# Patient Record
Sex: MALE | Race: WHITE | NOT HISPANIC OR LATINO | URBAN - METROPOLITAN AREA
[De-identification: names, ages, dates, MRNs, and addresses within clinical notes are randomized per-mention and may not be internally consistent; named-entity substitution may affect disease eponyms.]

---

## 2017-02-24 ENCOUNTER — EMERGENCY (EMERGENCY)
Facility: HOSPITAL | Age: 57
LOS: 0 days | Discharge: HOME | End: 2017-02-24
Attending: EMERGENCY MEDICINE
Payer: COMMERCIAL

## 2017-02-24 VITALS
RESPIRATION RATE: 17 BRPM | DIASTOLIC BLOOD PRESSURE: 104 MMHG | SYSTOLIC BLOOD PRESSURE: 151 MMHG | TEMPERATURE: 98 F | WEIGHT: 264.55 LBS | HEIGHT: 70 IN | OXYGEN SATURATION: 95 % | HEART RATE: 94 BPM

## 2017-02-24 VITALS
RESPIRATION RATE: 18 BRPM | SYSTOLIC BLOOD PRESSURE: 143 MMHG | HEART RATE: 86 BPM | TEMPERATURE: 100 F | OXYGEN SATURATION: 96 % | DIASTOLIC BLOOD PRESSURE: 94 MMHG

## 2017-02-24 DIAGNOSIS — S60.412A ABRASION OF RIGHT MIDDLE FINGER, INITIAL ENCOUNTER: Chronic | ICD-10-CM

## 2017-02-24 DIAGNOSIS — N40.0 BENIGN PROSTATIC HYPERPLASIA WITHOUT LOWER URINARY TRACT SYMPTOMS: ICD-10-CM

## 2017-02-24 DIAGNOSIS — I10 ESSENTIAL (PRIMARY) HYPERTENSION: ICD-10-CM

## 2017-02-24 DIAGNOSIS — K60.2 ANAL FISSURE, UNSPECIFIED: ICD-10-CM

## 2017-02-24 DIAGNOSIS — K59.00 CONSTIPATION, UNSPECIFIED: ICD-10-CM

## 2017-02-24 DIAGNOSIS — Z88.0 ALLERGY STATUS TO PENICILLIN: ICD-10-CM

## 2017-02-24 DIAGNOSIS — S83.242A OTHER TEAR OF MEDIAL MENISCUS, CURRENT INJURY, LEFT KNEE, INITIAL ENCOUNTER: Chronic | ICD-10-CM

## 2017-02-24 DIAGNOSIS — K92.1 MELENA: ICD-10-CM

## 2017-02-24 DIAGNOSIS — Z86.73 PERSONAL HISTORY OF TRANSIENT ISCHEMIC ATTACK (TIA), AND CEREBRAL INFARCTION WITHOUT RESIDUAL DEFICITS: ICD-10-CM

## 2017-02-24 LAB
ALBUMIN SERPL ELPH-MCNC: 3.5 G/DL — SIGNIFICANT CHANGE UP (ref 3.3–5)
ALP SERPL-CCNC: 110 U/L — SIGNIFICANT CHANGE UP (ref 40–120)
ALT FLD-CCNC: 49 U/L — SIGNIFICANT CHANGE UP (ref 12–78)
AMYLASE P1 CFR SERPL: 61 U/L — SIGNIFICANT CHANGE UP (ref 25–115)
ANION GAP SERPL CALC-SCNC: 10 MMOL/L — SIGNIFICANT CHANGE UP (ref 5–17)
APPEARANCE UR: CLEAR — SIGNIFICANT CHANGE UP
AST SERPL-CCNC: 31 U/L — SIGNIFICANT CHANGE UP (ref 15–37)
BACTERIA # UR AUTO: ABNORMAL
BASOPHILS # BLD AUTO: 0.1 K/UL — SIGNIFICANT CHANGE UP (ref 0–0.2)
BASOPHILS NFR BLD AUTO: 0.7 % — SIGNIFICANT CHANGE UP (ref 0–2)
BILIRUB SERPL-MCNC: 0.8 MG/DL — SIGNIFICANT CHANGE UP (ref 0.2–1.2)
BILIRUB UR-MCNC: NEGATIVE — SIGNIFICANT CHANGE UP
BUN SERPL-MCNC: 19 MG/DL — SIGNIFICANT CHANGE UP (ref 7–23)
CALCIUM SERPL-MCNC: 8.6 MG/DL — SIGNIFICANT CHANGE UP (ref 8.5–10.1)
CHLORIDE SERPL-SCNC: 101 MMOL/L — SIGNIFICANT CHANGE UP (ref 96–108)
CK SERPL-CCNC: 649 U/L — HIGH (ref 26–308)
CO2 SERPL-SCNC: 31 MMOL/L — SIGNIFICANT CHANGE UP (ref 22–31)
COLOR SPEC: YELLOW — SIGNIFICANT CHANGE UP
CREAT SERPL-MCNC: 1.08 MG/DL — SIGNIFICANT CHANGE UP (ref 0.5–1.3)
DIFF PNL FLD: ABNORMAL
EOSINOPHIL # BLD AUTO: 0.1 K/UL — SIGNIFICANT CHANGE UP (ref 0–0.5)
EOSINOPHIL NFR BLD AUTO: 1 % — SIGNIFICANT CHANGE UP (ref 0–6)
EPI CELLS # UR: SIGNIFICANT CHANGE UP
GLUCOSE SERPL-MCNC: 190 MG/DL — HIGH (ref 70–99)
GLUCOSE UR QL: NEGATIVE MG/DL — SIGNIFICANT CHANGE UP
HCT VFR BLD CALC: 41.8 % — SIGNIFICANT CHANGE UP (ref 39–50)
HGB BLD-MCNC: 14.7 G/DL — SIGNIFICANT CHANGE UP (ref 13–17)
HYALINE CASTS # UR AUTO: ABNORMAL /LPF
INR BLD: 1.07 RATIO — SIGNIFICANT CHANGE UP (ref 0.88–1.16)
KETONES UR-MCNC: ABNORMAL
LEUKOCYTE ESTERASE UR-ACNC: ABNORMAL
LYMPHOCYTES # BLD AUTO: 16 % — SIGNIFICANT CHANGE UP (ref 13–44)
LYMPHOCYTES # BLD AUTO: 2.1 K/UL — SIGNIFICANT CHANGE UP (ref 1–3.3)
MCHC RBC-ENTMCNC: 29.6 PG — SIGNIFICANT CHANGE UP (ref 27–34)
MCHC RBC-ENTMCNC: 35.3 GM/DL — SIGNIFICANT CHANGE UP (ref 32–36)
MCV RBC AUTO: 83.9 FL — SIGNIFICANT CHANGE UP (ref 80–100)
MONOCYTES # BLD AUTO: 0.7 K/UL — SIGNIFICANT CHANGE UP (ref 0–0.9)
MONOCYTES NFR BLD AUTO: 5.3 % — SIGNIFICANT CHANGE UP (ref 2–14)
NEUTROPHILS # BLD AUTO: 10.3 K/UL — HIGH (ref 1.8–7.4)
NEUTROPHILS NFR BLD AUTO: 77.1 % — HIGH (ref 43–77)
NITRITE UR-MCNC: NEGATIVE — SIGNIFICANT CHANGE UP
PH UR: 6 — SIGNIFICANT CHANGE UP (ref 4.8–8)
PLATELET # BLD AUTO: 259 K/UL — SIGNIFICANT CHANGE UP (ref 150–400)
POTASSIUM SERPL-MCNC: 3.3 MMOL/L — LOW (ref 3.5–5.3)
POTASSIUM SERPL-SCNC: 3.3 MMOL/L — LOW (ref 3.5–5.3)
PROT SERPL-MCNC: 7.4 GM/DL — SIGNIFICANT CHANGE UP (ref 6–8.3)
PROT UR-MCNC: 100 MG/DL
PROTHROM AB SERPL-ACNC: 12 SEC — SIGNIFICANT CHANGE UP (ref 10–13.1)
RBC # BLD: 4.98 M/UL — SIGNIFICANT CHANGE UP (ref 4.2–5.8)
RBC # FLD: 13 % — SIGNIFICANT CHANGE UP (ref 11–15)
RBC CASTS # UR COMP ASSIST: ABNORMAL /HPF (ref 0–4)
SODIUM SERPL-SCNC: 142 MMOL/L — SIGNIFICANT CHANGE UP (ref 135–145)
SP GR SPEC: 1.01 — SIGNIFICANT CHANGE UP (ref 1.01–1.02)
UROBILINOGEN FLD QL: NEGATIVE MG/DL — SIGNIFICANT CHANGE UP
WBC # BLD: 13.3 K/UL — HIGH (ref 3.8–10.5)
WBC # FLD AUTO: 13.3 K/UL — HIGH (ref 3.8–10.5)
WBC UR QL: SIGNIFICANT CHANGE UP

## 2017-02-24 PROCEDURE — 99284 EMERGENCY DEPT VISIT MOD MDM: CPT

## 2017-02-24 RX ORDER — DOCUSATE SODIUM 100 MG
1 CAPSULE ORAL
Qty: 7 | Refills: 0 | OUTPATIENT
Start: 2017-02-24 | End: 2017-03-03

## 2017-02-24 RX ORDER — POLYETHYLENE GLYCOL 3350 17 G/17G
17 POWDER, FOR SOLUTION ORAL
Qty: 150 | Refills: 0 | OUTPATIENT
Start: 2017-02-24 | End: 2017-03-03

## 2017-02-24 NOTE — ED ADULT NURSE NOTE - OBJECTIVE STATEMENT
patient received, alert and oriented x4, complaining of flank pain that started last night and rectal pain. stated last bowel movement was yesterday afternoon, solid, patient noted blood in stool. patient abdomen is patient's normal size, complaining of dizziness and nausea, no vomiting, no diarrhea, no constipation. patient received, alert and oriented x4, complaining of flank pain that started last night and rectal pain. stated last bowel movement was yesterday afternoon, solid, patient noted blood in stool. patient abdomen is patient's normal size, complaining of dizziness and nausea, no vomiting, no diarrhea, no constipation. as per patient's mom, patient has a chemical imbalance, patient has a history of psych disorder, unsure of what it is. that patient does not sleep well during the night.

## 2017-02-24 NOTE — ED PROVIDER NOTE - OBJECTIVE STATEMENT
Pertinent PMH/PSH/FHx/SHx and Review of Systems contained within:  Patient presents to the ED for rectal pain.  PMH of HTN, HLD, CVA, BPH.  Had BM today which was "hard like rocks."  VSS.  saw scant blood on paper.  no other complaints.  Non toxic.  Well appearing. No aggravating or relieving factors. No other pertinent PMH.  No other pertinent PSH.  No other pertinent FHx.  Patient denies EtOH/tobacco/illicit substance use. No fever/chills, No photophobia/eye pain/changes in vision, No ear pain/sore throat/dysphagia, No chest pain/palpitations, no SOB/cough/wheeze/stridor, No abdominal pain, No N/V/D, no dysuria/frequency/discharge, No neck/back pain, no rash, no changes in neurological status/function.

## 2017-02-24 NOTE — ED PROVIDER NOTE - MEDICAL DECISION MAKING DETAILS
Patient presnt with rectal pain now easing.  VSS.  Urinalysis demonstrates no acute pathology.  Urine culture pending. rectal with no stool.  minimal pain with small pile and likely mild fissure.  d/w patient.  will f/u.  Patient given prescription medications for their condition and advised to take them as prescribed and check with their Primary Care Provider if any questions arise. Discussed results and outcome of testing with the patient.  Patient advised to please follow up with their primary care doctor within the next 24 hours and return to the Emergency Department for worsening symptoms or any other concerns.  Patient advised that their doctor may call  to follow up on the specific results of the tests performed today in the emergency department.

## 2017-02-24 NOTE — ED PROVIDER NOTE - PHYSICAL EXAMINATION
Gen: Alert, NAD Head: NC, AT, PERRL, EOMI, normal lids/conjunctiva ENT: normal hearing, patent oropharynx without erythema/exudate, uvula midline Neck: +supple, no tenderness/meningismus/JVD, +Trachea midline Pulm: Bilateral BS, normal resp effort, no wheeze/stridor/retractions CV: RRR, no M/R/G, +dist pulses Abd: soft, NT/ND, +BS, no hepatosplenomegaly Mskel: no edema/erythema/cyanosis Skin: no rash Neuro: AAOx3, no sensory/motor deficits, CN 2-12 intact, rectal: normal tone, no stool

## 2017-02-24 NOTE — ED ADULT TRIAGE NOTE - CHIEF COMPLAINT QUOTE
Constipated  and feeling back pains, lightheadedness with dizziness and I think it may be my kidneys. I went to the bathroom and the stool was hard and difficulty to past. He took multiple medications and enema. He did pass stool, blood was in the toilet and when he wiped

## 2017-02-24 NOTE — ED ADULT NURSE NOTE - PSH
Abrasion of right middle finger    Acute medial meniscus tear of left knee    History of appendectomy

## 2017-08-11 ENCOUNTER — EMERGENCY (EMERGENCY)
Facility: HOSPITAL | Age: 57
LOS: 0 days | Discharge: ANOTHER HOSPITAL | End: 2017-08-12
Attending: EMERGENCY MEDICINE
Payer: COMMERCIAL

## 2017-08-11 VITALS
HEIGHT: 67 IN | RESPIRATION RATE: 17 BRPM | HEART RATE: 61 BPM | OXYGEN SATURATION: 100 % | SYSTOLIC BLOOD PRESSURE: 131 MMHG | TEMPERATURE: 98 F | WEIGHT: 216.05 LBS | DIASTOLIC BLOOD PRESSURE: 102 MMHG

## 2017-08-11 DIAGNOSIS — Z88.0 ALLERGY STATUS TO PENICILLIN: ICD-10-CM

## 2017-08-11 DIAGNOSIS — E11.9 TYPE 2 DIABETES MELLITUS WITHOUT COMPLICATIONS: ICD-10-CM

## 2017-08-11 DIAGNOSIS — I62.00 NONTRAUMATIC SUBDURAL HEMORRHAGE, UNSPECIFIED: ICD-10-CM

## 2017-08-11 DIAGNOSIS — I10 ESSENTIAL (PRIMARY) HYPERTENSION: ICD-10-CM

## 2017-08-11 DIAGNOSIS — F17.210 NICOTINE DEPENDENCE, CIGARETTES, UNCOMPLICATED: ICD-10-CM

## 2017-08-11 DIAGNOSIS — Z88.9 ALLERGY STATUS TO UNSPECIFIED DRUGS, MEDICAMENTS AND BIOLOGICAL SUBSTANCES: ICD-10-CM

## 2017-08-11 DIAGNOSIS — R53.1 WEAKNESS: ICD-10-CM

## 2017-08-11 DIAGNOSIS — R51 HEADACHE: ICD-10-CM

## 2017-08-11 DIAGNOSIS — Z79.4 LONG TERM (CURRENT) USE OF INSULIN: ICD-10-CM

## 2017-08-11 DIAGNOSIS — S60.412A ABRASION OF RIGHT MIDDLE FINGER, INITIAL ENCOUNTER: Chronic | ICD-10-CM

## 2017-08-11 DIAGNOSIS — Z79.82 LONG TERM (CURRENT) USE OF ASPIRIN: ICD-10-CM

## 2017-08-11 DIAGNOSIS — E78.00 PURE HYPERCHOLESTEROLEMIA, UNSPECIFIED: ICD-10-CM

## 2017-08-11 DIAGNOSIS — S83.242A OTHER TEAR OF MEDIAL MENISCUS, CURRENT INJURY, LEFT KNEE, INITIAL ENCOUNTER: Chronic | ICD-10-CM

## 2017-08-11 LAB
ALBUMIN SERPL ELPH-MCNC: 3.7 G/DL — SIGNIFICANT CHANGE UP (ref 3.3–5)
ALP SERPL-CCNC: 119 U/L — SIGNIFICANT CHANGE UP (ref 40–120)
ALT FLD-CCNC: 63 U/L — SIGNIFICANT CHANGE UP (ref 12–78)
ANION GAP SERPL CALC-SCNC: 9 MMOL/L — SIGNIFICANT CHANGE UP (ref 5–17)
APTT BLD: 33.3 SEC — SIGNIFICANT CHANGE UP (ref 27.5–37.4)
AST SERPL-CCNC: 47 U/L — HIGH (ref 15–37)
BASOPHILS # BLD AUTO: 0.1 K/UL — SIGNIFICANT CHANGE UP (ref 0–0.2)
BASOPHILS NFR BLD AUTO: 0.4 % — SIGNIFICANT CHANGE UP (ref 0–2)
BILIRUB SERPL-MCNC: 1.2 MG/DL — SIGNIFICANT CHANGE UP (ref 0.2–1.2)
BUN SERPL-MCNC: 12 MG/DL — SIGNIFICANT CHANGE UP (ref 7–23)
CALCIUM SERPL-MCNC: 9.3 MG/DL — SIGNIFICANT CHANGE UP (ref 8.5–10.1)
CHLORIDE SERPL-SCNC: 100 MMOL/L — SIGNIFICANT CHANGE UP (ref 96–108)
CK MB BLD-MCNC: 1.9 % — SIGNIFICANT CHANGE UP (ref 0–3.5)
CK MB CFR SERPL CALC: 7.9 NG/ML — HIGH (ref 0.5–3.6)
CK SERPL-CCNC: 410 U/L — HIGH (ref 26–308)
CO2 SERPL-SCNC: 33 MMOL/L — HIGH (ref 22–31)
CREAT SERPL-MCNC: 1.22 MG/DL — SIGNIFICANT CHANGE UP (ref 0.5–1.3)
EOSINOPHIL # BLD AUTO: 0 K/UL — SIGNIFICANT CHANGE UP (ref 0–0.5)
EOSINOPHIL NFR BLD AUTO: 0.3 % — SIGNIFICANT CHANGE UP (ref 0–6)
GLUCOSE SERPL-MCNC: 160 MG/DL — HIGH (ref 70–99)
HCT VFR BLD CALC: 49.7 % — SIGNIFICANT CHANGE UP (ref 39–50)
HGB BLD-MCNC: 16.8 G/DL — SIGNIFICANT CHANGE UP (ref 13–17)
INR BLD: 1.09 RATIO — SIGNIFICANT CHANGE UP (ref 0.88–1.16)
LYMPHOCYTES # BLD AUTO: 1.5 K/UL — SIGNIFICANT CHANGE UP (ref 1–3.3)
LYMPHOCYTES # BLD AUTO: 11.3 % — LOW (ref 13–44)
MCHC RBC-ENTMCNC: 28.1 PG — SIGNIFICANT CHANGE UP (ref 27–34)
MCHC RBC-ENTMCNC: 33.8 GM/DL — SIGNIFICANT CHANGE UP (ref 32–36)
MCV RBC AUTO: 83.1 FL — SIGNIFICANT CHANGE UP (ref 80–100)
MONOCYTES # BLD AUTO: 0.6 K/UL — SIGNIFICANT CHANGE UP (ref 0–0.9)
MONOCYTES NFR BLD AUTO: 5 % — SIGNIFICANT CHANGE UP (ref 2–14)
NEUTROPHILS # BLD AUTO: 10.7 K/UL — HIGH (ref 1.8–7.4)
NEUTROPHILS NFR BLD AUTO: 83 % — HIGH (ref 43–77)
PLATELET # BLD AUTO: 301 K/UL — SIGNIFICANT CHANGE UP (ref 150–400)
POTASSIUM SERPL-MCNC: 3.4 MMOL/L — LOW (ref 3.5–5.3)
POTASSIUM SERPL-SCNC: 3.4 MMOL/L — LOW (ref 3.5–5.3)
PROT SERPL-MCNC: 8.1 GM/DL — SIGNIFICANT CHANGE UP (ref 6–8.3)
PROTHROM AB SERPL-ACNC: 11.9 SEC — SIGNIFICANT CHANGE UP (ref 9.8–12.7)
RBC # BLD: 5.98 M/UL — HIGH (ref 4.2–5.8)
RBC # FLD: 13.4 % — SIGNIFICANT CHANGE UP (ref 11–15)
SODIUM SERPL-SCNC: 142 MMOL/L — SIGNIFICANT CHANGE UP (ref 135–145)
TROPONIN I SERPL-MCNC: <.015 NG/ML — SIGNIFICANT CHANGE UP (ref 0.01–0.04)
WBC # BLD: 12.9 K/UL — HIGH (ref 3.8–10.5)
WBC # FLD AUTO: 12.9 K/UL — HIGH (ref 3.8–10.5)

## 2017-08-11 PROCEDURE — 99285 EMERGENCY DEPT VISIT HI MDM: CPT

## 2017-08-11 RX ORDER — ONDANSETRON 8 MG/1
8 TABLET, FILM COATED ORAL ONCE
Qty: 0 | Refills: 0 | Status: COMPLETED | OUTPATIENT
Start: 2017-08-11 | End: 2017-08-11

## 2017-08-11 RX ADMIN — ONDANSETRON 8 MILLIGRAM(S): 8 TABLET, FILM COATED ORAL at 23:09

## 2017-08-11 NOTE — ED PROVIDER NOTE - OBJECTIVE STATEMENT
Pertinent PMH/PSH/FHx/SHx and Review of Systems contained within:    58yo M w PMH of HTN, DM2, previous CVA noncompliant w ASA presents to ED c/o HA, abd pain & weakness.  Pt states about 3-4d ago he noted HA & has had intermittent difficulty walking.  Pt also c/o abd cramping w loose stools.  Denies fever, chills, vomiting, SOB, ingestion of contaminated foods, urinary sx, syncope, trauma.    No fever/chills, No photophobia/eye pain/changes in vision, No ear pain/sore throat/dysphagia, No chest pain/palpitations, no SOB/cough/wheeze/stridor, +abdominal pain, No neck/back pain, no rash, no changes in neurological status/function. Pertinent PMH/PSH/FHx/SHx and Review of Systems contained within:    56yo M w PMH of HTN, DM2, previous CVA L sided SDH not on ASA presents to ED c/o HA, abd pain & weakness.  Pt states about 3-4d ago he noted HA & has had intermittent difficulty walking.  Pt also c/o abd cramping w loose stools.  Denies fever, chills, vomiting, SOB, ingestion of contaminated foods, urinary sx, syncope, trauma.    No fever/chills, No photophobia/eye pain/changes in vision, No ear pain/sore throat/dysphagia, No chest pain/palpitations, no SOB/cough/wheeze/stridor, +abdominal pain, No neck/back pain, no rash, no changes in neurological status/function. Pertinent PMH/PSH/FHx/SHx and Review of Systems contained within:    56yo M w PMH of HTN, DM2, previous CVA not on ASA presents to ED c/o HA, abd pain & weakness.  Pt states about 3-4d ago he noted HA & has had intermittent difficulty walking.  Pt also c/o abd cramping w loose stools.  Denies fever, chills, vomiting, SOB, ingestion of contaminated foods, urinary sx, syncope, trauma.    No fever/chills, No photophobia/eye pain/changes in vision, No ear pain/sore throat/dysphagia, No chest pain/palpitations, no SOB/cough/wheeze/stridor, +abdominal pain, No neck/back pain, no rash, no changes in neurological status/function.

## 2017-08-11 NOTE — ED ADULT TRIAGE NOTE - CHIEF COMPLAINT QUOTE
dizziness , diffuses abdominal pain and back pain, nausea ,problem with urination, right side weakness duo to stroke general weakness, headache and abdominal pain

## 2017-08-11 NOTE — ED PROVIDER NOTE - PROGRESS NOTE DETAILS
Discussed CT findings w pt, states he had L sided SDH due to fall at [facility] months ago.  Denies fall this time. Discussed w Dr. Marquez, neurosurg accepted pt, endorsed to Dr. Brown in ED. Discussed CT findings w pt, states he had L sided SDH due to fall at [facility] months ago.  Pt states he was admitted, no procedures done.  Denies fall this time.

## 2017-08-11 NOTE — ED PROVIDER NOTE - PHYSICAL EXAMINATION
Gen: Alert, NAD, speaking in complete sentences  Head: NC, AT, PERRL, EOMI, normal lids/conjunctiva  ENT: normal hearing, patent oropharynx without erythema/exudate, uvula midline  Neck: supple, no tenderness/meningismus/JVD, Trachea midline  Pulm: Bilateral clear BS, normal resp effort, no wheeze/stridor/retractions  CV: RRR, no M/R/G, +dist pulses  Abd: soft, no focal TTP, ND, +BS, no guarding/rebound tenderness, +obese  Mskel: no edema/erythema/cyanosis, motor 5/5 and equal in upper & lower ext bilaterally  Skin: no rash  Neuro: AAOx3, no sensory/motor deficits, CN 2-12 intact, NIHSS 0

## 2017-08-11 NOTE — ED ADULT NURSE REASSESSMENT NOTE - NS ED NURSE REASSESS COMMENT FT1
Pt A&Ox4. No distress noted. Elevated /101 Noted. Pt c/o headache. Dr. Rivera notified. Awaiting orders. Pt due for meds, Will give and continue to monitor BP

## 2017-08-12 ENCOUNTER — TRANSCRIPTION ENCOUNTER (OUTPATIENT)
Age: 57
End: 2017-08-12

## 2017-08-12 ENCOUNTER — INPATIENT (INPATIENT)
Facility: HOSPITAL | Age: 57
LOS: 3 days | Discharge: NURSING, REHAB OR LONG-TERM CARE | DRG: 26 | End: 2017-08-16
Attending: NEUROLOGICAL SURGERY | Admitting: NEUROLOGICAL SURGERY
Payer: MEDICAID

## 2017-08-12 VITALS — SYSTOLIC BLOOD PRESSURE: 142 MMHG | DIASTOLIC BLOOD PRESSURE: 89 MMHG | HEART RATE: 90 BPM | RESPIRATION RATE: 17 BRPM

## 2017-08-12 VITALS
RESPIRATION RATE: 16 BRPM | HEART RATE: 92 BPM | DIASTOLIC BLOOD PRESSURE: 96 MMHG | OXYGEN SATURATION: 96 % | SYSTOLIC BLOOD PRESSURE: 165 MMHG

## 2017-08-12 DIAGNOSIS — I62.00 NONTRAUMATIC SUBDURAL HEMORRHAGE, UNSPECIFIED: ICD-10-CM

## 2017-08-12 DIAGNOSIS — S83.242A OTHER TEAR OF MEDIAL MENISCUS, CURRENT INJURY, LEFT KNEE, INITIAL ENCOUNTER: Chronic | ICD-10-CM

## 2017-08-12 DIAGNOSIS — E11.65 TYPE 2 DIABETES MELLITUS WITH HYPERGLYCEMIA: ICD-10-CM

## 2017-08-12 DIAGNOSIS — E78.5 HYPERLIPIDEMIA, UNSPECIFIED: ICD-10-CM

## 2017-08-12 DIAGNOSIS — S60.412A ABRASION OF RIGHT MIDDLE FINGER, INITIAL ENCOUNTER: Chronic | ICD-10-CM

## 2017-08-12 DIAGNOSIS — I10 ESSENTIAL (PRIMARY) HYPERTENSION: ICD-10-CM

## 2017-08-12 LAB
ALBUMIN SERPL ELPH-MCNC: 3.8 G/DL — SIGNIFICANT CHANGE UP (ref 3.3–5)
ALP SERPL-CCNC: 98 U/L — SIGNIFICANT CHANGE UP (ref 40–120)
ALT FLD-CCNC: 47 U/L RC — HIGH (ref 10–45)
ANION GAP SERPL CALC-SCNC: 14 MMOL/L — SIGNIFICANT CHANGE UP (ref 5–17)
ANION GAP SERPL CALC-SCNC: 14 MMOL/L — SIGNIFICANT CHANGE UP (ref 5–17)
AST SERPL-CCNC: 38 U/L — SIGNIFICANT CHANGE UP (ref 10–40)
BILIRUB SERPL-MCNC: 1.2 MG/DL — SIGNIFICANT CHANGE UP (ref 0.2–1.2)
BLD GP AB SCN SERPL QL: NEGATIVE — SIGNIFICANT CHANGE UP
BUN SERPL-MCNC: 15 MG/DL — SIGNIFICANT CHANGE UP (ref 7–23)
BUN SERPL-MCNC: 21 MG/DL — SIGNIFICANT CHANGE UP (ref 7–23)
CALCIUM SERPL-MCNC: 8.5 MG/DL — SIGNIFICANT CHANGE UP (ref 8.4–10.5)
CALCIUM SERPL-MCNC: 8.9 MG/DL — SIGNIFICANT CHANGE UP (ref 8.4–10.5)
CHLORIDE SERPL-SCNC: 100 MMOL/L — SIGNIFICANT CHANGE UP (ref 96–108)
CHLORIDE SERPL-SCNC: 101 MMOL/L — SIGNIFICANT CHANGE UP (ref 96–108)
CHOLEST SERPL-MCNC: 177 MG/DL — SIGNIFICANT CHANGE UP (ref 10–199)
CO2 SERPL-SCNC: 25 MMOL/L — SIGNIFICANT CHANGE UP (ref 22–31)
CO2 SERPL-SCNC: 27 MMOL/L — SIGNIFICANT CHANGE UP (ref 22–31)
CREAT SERPL-MCNC: 1.01 MG/DL — SIGNIFICANT CHANGE UP (ref 0.5–1.3)
CREAT SERPL-MCNC: 1.25 MG/DL — SIGNIFICANT CHANGE UP (ref 0.5–1.3)
GLUCOSE SERPL-MCNC: 228 MG/DL — HIGH (ref 70–99)
GLUCOSE SERPL-MCNC: 254 MG/DL — HIGH (ref 70–99)
HBA1C BLD-MCNC: 7.5 % — HIGH (ref 4–5.6)
HCT VFR BLD CALC: 44.8 % — SIGNIFICANT CHANGE UP (ref 39–50)
HCT VFR BLD CALC: 47.7 % — SIGNIFICANT CHANGE UP (ref 39–50)
HDLC SERPL-MCNC: 43 MG/DL — SIGNIFICANT CHANGE UP (ref 40–125)
HGB BLD-MCNC: 15 G/DL — SIGNIFICANT CHANGE UP (ref 13–17)
HGB BLD-MCNC: 16.4 G/DL — SIGNIFICANT CHANGE UP (ref 13–17)
LIPID PNL WITH DIRECT LDL SERPL: 116 MG/DL — SIGNIFICANT CHANGE UP
MAGNESIUM SERPL-MCNC: 1.4 MG/DL — LOW (ref 1.6–2.6)
MCHC RBC-ENTMCNC: 29.1 PG — SIGNIFICANT CHANGE UP (ref 27–34)
MCHC RBC-ENTMCNC: 29.7 PG — SIGNIFICANT CHANGE UP (ref 27–34)
MCHC RBC-ENTMCNC: 33.5 GM/DL — SIGNIFICANT CHANGE UP (ref 32–36)
MCHC RBC-ENTMCNC: 34.3 GM/DL — SIGNIFICANT CHANGE UP (ref 32–36)
MCV RBC AUTO: 86.7 FL — SIGNIFICANT CHANGE UP (ref 80–100)
MCV RBC AUTO: 86.9 FL — SIGNIFICANT CHANGE UP (ref 80–100)
PHOSPHATE SERPL-MCNC: 2.9 MG/DL — SIGNIFICANT CHANGE UP (ref 2.5–4.5)
PLATELET # BLD AUTO: 293 K/UL — SIGNIFICANT CHANGE UP (ref 150–400)
PLATELET # BLD AUTO: 333 K/UL — SIGNIFICANT CHANGE UP (ref 150–400)
POTASSIUM SERPL-MCNC: 3.7 MMOL/L — SIGNIFICANT CHANGE UP (ref 3.5–5.3)
POTASSIUM SERPL-MCNC: 3.8 MMOL/L — SIGNIFICANT CHANGE UP (ref 3.5–5.3)
POTASSIUM SERPL-SCNC: 3.7 MMOL/L — SIGNIFICANT CHANGE UP (ref 3.5–5.3)
POTASSIUM SERPL-SCNC: 3.8 MMOL/L — SIGNIFICANT CHANGE UP (ref 3.5–5.3)
PROT SERPL-MCNC: 7.3 G/DL — SIGNIFICANT CHANGE UP (ref 6–8.3)
RBC # BLD: 5.16 M/UL — SIGNIFICANT CHANGE UP (ref 4.2–5.8)
RBC # BLD: 5.51 M/UL — SIGNIFICANT CHANGE UP (ref 4.2–5.8)
RBC # FLD: 13.3 % — SIGNIFICANT CHANGE UP (ref 10.3–14.5)
RBC # FLD: 13.4 % — SIGNIFICANT CHANGE UP (ref 10.3–14.5)
RH IG SCN BLD-IMP: POSITIVE — SIGNIFICANT CHANGE UP
RH IG SCN BLD-IMP: POSITIVE — SIGNIFICANT CHANGE UP
SODIUM SERPL-SCNC: 140 MMOL/L — SIGNIFICANT CHANGE UP (ref 135–145)
SODIUM SERPL-SCNC: 141 MMOL/L — SIGNIFICANT CHANGE UP (ref 135–145)
TOTAL CHOLESTEROL/HDL RATIO MEASUREMENT: 4.1 RATIO — SIGNIFICANT CHANGE UP (ref 3.4–9.6)
TRIGL SERPL-MCNC: 88 MG/DL — SIGNIFICANT CHANGE UP (ref 10–149)
WBC # BLD: 11.7 K/UL — HIGH (ref 3.8–10.5)
WBC # BLD: 20.7 K/UL — HIGH (ref 3.8–10.5)
WBC # FLD AUTO: 11.7 K/UL — HIGH (ref 3.8–10.5)
WBC # FLD AUTO: 20.7 K/UL — HIGH (ref 3.8–10.5)

## 2017-08-12 PROCEDURE — 70450 CT HEAD/BRAIN W/O DYE: CPT | Mod: 26,77

## 2017-08-12 PROCEDURE — 93306 TTE W/DOPPLER COMPLETE: CPT | Mod: 26

## 2017-08-12 PROCEDURE — 99223 1ST HOSP IP/OBS HIGH 75: CPT

## 2017-08-12 PROCEDURE — 99291 CRITICAL CARE FIRST HOUR: CPT

## 2017-08-12 PROCEDURE — 70450 CT HEAD/BRAIN W/O DYE: CPT | Mod: 26,59

## 2017-08-12 PROCEDURE — 71010: CPT | Mod: 26

## 2017-08-12 PROCEDURE — 61154 BURR HOLE W/EVAC&/DRG HMTMA: CPT | Mod: RT

## 2017-08-12 PROCEDURE — 74177 CT ABD & PELVIS W/CONTRAST: CPT | Mod: 26

## 2017-08-12 PROCEDURE — 70496 CT ANGIOGRAPHY HEAD: CPT | Mod: 26

## 2017-08-12 PROCEDURE — 93010 ELECTROCARDIOGRAM REPORT: CPT

## 2017-08-12 RX ORDER — INSULIN GLARGINE 100 [IU]/ML
5 INJECTION, SOLUTION SUBCUTANEOUS AT BEDTIME
Qty: 0 | Refills: 0 | Status: DISCONTINUED | OUTPATIENT
Start: 2017-08-12 | End: 2017-08-12

## 2017-08-12 RX ORDER — AMLODIPINE BESYLATE 2.5 MG/1
5 TABLET ORAL DAILY
Qty: 0 | Refills: 0 | Status: DISCONTINUED | OUTPATIENT
Start: 2017-08-12 | End: 2017-08-12

## 2017-08-12 RX ORDER — SODIUM CHLORIDE 9 MG/ML
1000 INJECTION, SOLUTION INTRAVENOUS
Qty: 0 | Refills: 0 | Status: DISCONTINUED | OUTPATIENT
Start: 2017-08-12 | End: 2017-08-13

## 2017-08-12 RX ORDER — OXYCODONE AND ACETAMINOPHEN 5; 325 MG/1; MG/1
2 TABLET ORAL EVERY 4 HOURS
Qty: 0 | Refills: 0 | Status: DISCONTINUED | OUTPATIENT
Start: 2017-08-12 | End: 2017-08-16

## 2017-08-12 RX ORDER — LOSARTAN POTASSIUM 100 MG/1
25 TABLET, FILM COATED ORAL DAILY
Qty: 0 | Refills: 0 | Status: DISCONTINUED | OUTPATIENT
Start: 2017-08-12 | End: 2017-08-12

## 2017-08-12 RX ORDER — DEXTROSE 50 % IN WATER 50 %
12.5 SYRINGE (ML) INTRAVENOUS ONCE
Qty: 0 | Refills: 0 | Status: DISCONTINUED | OUTPATIENT
Start: 2017-08-12 | End: 2017-08-16

## 2017-08-12 RX ORDER — DEXTROSE 50 % IN WATER 50 %
25 SYRINGE (ML) INTRAVENOUS ONCE
Qty: 0 | Refills: 0 | Status: DISCONTINUED | OUTPATIENT
Start: 2017-08-12 | End: 2017-08-16

## 2017-08-12 RX ORDER — AMLODIPINE BESYLATE 2.5 MG/1
5 TABLET ORAL DAILY
Qty: 0 | Refills: 0 | Status: DISCONTINUED | OUTPATIENT
Start: 2017-08-12 | End: 2017-08-13

## 2017-08-12 RX ORDER — ACETAMINOPHEN 500 MG
650 TABLET ORAL EVERY 6 HOURS
Qty: 0 | Refills: 0 | Status: DISCONTINUED | OUTPATIENT
Start: 2017-08-12 | End: 2017-08-16

## 2017-08-12 RX ORDER — FAMOTIDINE 10 MG/ML
20 INJECTION INTRAVENOUS EVERY 12 HOURS
Qty: 0 | Refills: 0 | Status: DISCONTINUED | OUTPATIENT
Start: 2017-08-12 | End: 2017-08-16

## 2017-08-12 RX ORDER — DIPHENHYDRAMINE HCL 50 MG
50 CAPSULE ORAL ONCE
Qty: 0 | Refills: 0 | Status: COMPLETED | OUTPATIENT
Start: 2017-08-12 | End: 2017-08-12

## 2017-08-12 RX ORDER — POTASSIUM CHLORIDE 20 MEQ
20 PACKET (EA) ORAL ONCE
Qty: 0 | Refills: 0 | Status: COMPLETED | OUTPATIENT
Start: 2017-08-12 | End: 2017-08-12

## 2017-08-12 RX ORDER — LEVETIRACETAM 250 MG/1
500 TABLET, FILM COATED ORAL EVERY 12 HOURS
Qty: 0 | Refills: 0 | Status: DISCONTINUED | OUTPATIENT
Start: 2017-08-12 | End: 2017-08-16

## 2017-08-12 RX ORDER — OXYCODONE AND ACETAMINOPHEN 5; 325 MG/1; MG/1
1 TABLET ORAL EVERY 4 HOURS
Qty: 0 | Refills: 0 | Status: DISCONTINUED | OUTPATIENT
Start: 2017-08-12 | End: 2017-08-16

## 2017-08-12 RX ORDER — NICARDIPINE HYDROCHLORIDE 30 MG/1
2.5 CAPSULE, EXTENDED RELEASE ORAL
Qty: 40 | Refills: 0 | Status: DISCONTINUED | OUTPATIENT
Start: 2017-08-12 | End: 2017-08-12

## 2017-08-12 RX ORDER — DEXTROSE MONOHYDRATE, SODIUM CHLORIDE, AND POTASSIUM CHLORIDE 50; .745; 4.5 G/1000ML; G/1000ML; G/1000ML
1000 INJECTION, SOLUTION INTRAVENOUS
Qty: 0 | Refills: 0 | Status: DISCONTINUED | OUTPATIENT
Start: 2017-08-12 | End: 2017-08-13

## 2017-08-12 RX ORDER — INSULIN LISPRO 100/ML
VIAL (ML) SUBCUTANEOUS
Qty: 0 | Refills: 0 | Status: DISCONTINUED | OUTPATIENT
Start: 2017-08-12 | End: 2017-08-16

## 2017-08-12 RX ORDER — DEXAMETHASONE 0.5 MG/5ML
10 ELIXIR ORAL ONCE
Qty: 0 | Refills: 0 | Status: COMPLETED | OUTPATIENT
Start: 2017-08-12 | End: 2017-08-12

## 2017-08-12 RX ORDER — ACETAMINOPHEN 500 MG
975 TABLET ORAL ONCE
Qty: 0 | Refills: 0 | Status: COMPLETED | OUTPATIENT
Start: 2017-08-12 | End: 2017-08-12

## 2017-08-12 RX ORDER — DOCUSATE SODIUM 100 MG
100 CAPSULE ORAL DAILY
Qty: 0 | Refills: 0 | Status: DISCONTINUED | OUTPATIENT
Start: 2017-08-12 | End: 2017-08-12

## 2017-08-12 RX ORDER — METOCLOPRAMIDE HCL 10 MG
10 TABLET ORAL ONCE
Qty: 0 | Refills: 0 | Status: COMPLETED | OUTPATIENT
Start: 2017-08-12 | End: 2017-08-12

## 2017-08-12 RX ORDER — DEXAMETHASONE 0.5 MG/5ML
10 ELIXIR ORAL ONCE
Qty: 0 | Refills: 0 | Status: DISCONTINUED | OUTPATIENT
Start: 2017-08-12 | End: 2017-08-12

## 2017-08-12 RX ORDER — INSULIN LISPRO 100/ML
VIAL (ML) SUBCUTANEOUS AT BEDTIME
Qty: 0 | Refills: 0 | Status: DISCONTINUED | OUTPATIENT
Start: 2017-08-12 | End: 2017-08-16

## 2017-08-12 RX ORDER — ONDANSETRON 8 MG/1
4 TABLET, FILM COATED ORAL EVERY 6 HOURS
Qty: 0 | Refills: 0 | Status: DISCONTINUED | OUTPATIENT
Start: 2017-08-12 | End: 2017-08-16

## 2017-08-12 RX ORDER — DEXTROSE 50 % IN WATER 50 %
1 SYRINGE (ML) INTRAVENOUS ONCE
Qty: 0 | Refills: 0 | Status: DISCONTINUED | OUTPATIENT
Start: 2017-08-12 | End: 2017-08-16

## 2017-08-12 RX ORDER — INSULIN LISPRO 100/ML
VIAL (ML) SUBCUTANEOUS
Qty: 0 | Refills: 0 | Status: DISCONTINUED | OUTPATIENT
Start: 2017-08-12 | End: 2017-08-12

## 2017-08-12 RX ORDER — LEVETIRACETAM 250 MG/1
1000 TABLET, FILM COATED ORAL ONCE
Qty: 0 | Refills: 0 | Status: COMPLETED | OUTPATIENT
Start: 2017-08-12 | End: 2017-08-12

## 2017-08-12 RX ORDER — ATORVASTATIN CALCIUM 80 MG/1
10 TABLET, FILM COATED ORAL AT BEDTIME
Qty: 0 | Refills: 0 | Status: DISCONTINUED | OUTPATIENT
Start: 2017-08-12 | End: 2017-08-12

## 2017-08-12 RX ORDER — INSULIN LISPRO 100/ML
7 VIAL (ML) SUBCUTANEOUS
Qty: 0 | Refills: 0 | Status: DISCONTINUED | OUTPATIENT
Start: 2017-08-12 | End: 2017-08-13

## 2017-08-12 RX ORDER — INSULIN GLARGINE 100 [IU]/ML
20 INJECTION, SOLUTION SUBCUTANEOUS AT BEDTIME
Qty: 0 | Refills: 0 | Status: DISCONTINUED | OUTPATIENT
Start: 2017-08-12 | End: 2017-08-13

## 2017-08-12 RX ORDER — LOSARTAN POTASSIUM 100 MG/1
25 TABLET, FILM COATED ORAL DAILY
Qty: 0 | Refills: 0 | Status: DISCONTINUED | OUTPATIENT
Start: 2017-08-12 | End: 2017-08-15

## 2017-08-12 RX ORDER — MORPHINE SULFATE 50 MG/1
2 CAPSULE, EXTENDED RELEASE ORAL EVERY 4 HOURS
Qty: 0 | Refills: 0 | Status: DISCONTINUED | OUTPATIENT
Start: 2017-08-12 | End: 2017-08-12

## 2017-08-12 RX ORDER — GLUCAGON INJECTION, SOLUTION 0.5 MG/.1ML
1 INJECTION, SOLUTION SUBCUTANEOUS ONCE
Qty: 0 | Refills: 0 | Status: DISCONTINUED | OUTPATIENT
Start: 2017-08-12 | End: 2017-08-16

## 2017-08-12 RX ORDER — OXYCODONE AND ACETAMINOPHEN 5; 325 MG/1; MG/1
2 TABLET ORAL EVERY 6 HOURS
Qty: 0 | Refills: 0 | Status: DISCONTINUED | OUTPATIENT
Start: 2017-08-12 | End: 2017-08-12

## 2017-08-12 RX ORDER — ENOXAPARIN SODIUM 100 MG/ML
40 INJECTION SUBCUTANEOUS AT BEDTIME
Qty: 0 | Refills: 0 | Status: DISCONTINUED | OUTPATIENT
Start: 2017-08-13 | End: 2017-08-16

## 2017-08-12 RX ADMIN — Medication 100 MILLIGRAM(S): at 22:57

## 2017-08-12 RX ADMIN — LOSARTAN POTASSIUM 25 MILLIGRAM(S): 100 TABLET, FILM COATED ORAL at 22:57

## 2017-08-12 RX ADMIN — Medication 100 MILLIGRAM(S): at 13:05

## 2017-08-12 RX ADMIN — OXYCODONE AND ACETAMINOPHEN 1 TABLET(S): 5; 325 TABLET ORAL at 23:07

## 2017-08-12 RX ADMIN — FAMOTIDINE 20 MILLIGRAM(S): 10 INJECTION INTRAVENOUS at 17:42

## 2017-08-12 RX ADMIN — NICARDIPINE HYDROCHLORIDE 12.5 MG/HR: 30 CAPSULE, EXTENDED RELEASE ORAL at 03:32

## 2017-08-12 RX ADMIN — Medication 20 MILLIEQUIVALENT(S): at 13:04

## 2017-08-12 RX ADMIN — Medication 8: at 18:35

## 2017-08-12 RX ADMIN — OXYCODONE AND ACETAMINOPHEN 1 TABLET(S): 5; 325 TABLET ORAL at 23:37

## 2017-08-12 RX ADMIN — Medication 2: at 11:22

## 2017-08-12 RX ADMIN — Medication 50 MILLIGRAM(S): at 00:30

## 2017-08-12 RX ADMIN — AMLODIPINE BESYLATE 5 MILLIGRAM(S): 2.5 TABLET ORAL at 11:21

## 2017-08-12 RX ADMIN — Medication 2: at 22:57

## 2017-08-12 RX ADMIN — Medication 10 MILLIGRAM(S): at 00:30

## 2017-08-12 RX ADMIN — INSULIN GLARGINE 20 UNIT(S): 100 INJECTION, SOLUTION SUBCUTANEOUS at 22:58

## 2017-08-12 RX ADMIN — LEVETIRACETAM 1000 MILLIGRAM(S): 250 TABLET, FILM COATED ORAL at 05:37

## 2017-08-12 RX ADMIN — LEVETIRACETAM 500 MILLIGRAM(S): 250 TABLET, FILM COATED ORAL at 17:42

## 2017-08-12 RX ADMIN — Medication 7 UNIT(S): at 18:35

## 2017-08-12 RX ADMIN — Medication 102 MILLIGRAM(S): at 03:47

## 2017-08-12 RX ADMIN — Medication 975 MILLIGRAM(S): at 00:30

## 2017-08-12 NOTE — PROGRESS NOTE ADULT - ASSESSMENT
ASSESSMENT/PLAN:  atraumatic SDH s/p mary grace hole evacuation    NEURO: Neurochecks q1h, keppra sz ppx to end 8/19, analgesia prn,   Activity: [] mobilize as tolerated [] Bedrest [] PT [] OT [] PMNR    PULM: NRB for pneumocephalus    CV: home antihypertensive regimen, TTE  SBP goal 100-150    RENAL: continue IVF until po intake suficient then IVL  Fluids: NS @50ml/hr    GI: famotidine  Diet: CCD diet  GI prophylaxis [] not indicated [] PPI [] other:  Bowel regimen [] colace [] senna [] other:    ENDO: insulin sliding scale  Goal euglycemia (-180)    HEME/ONC:   VTE prophylaxis: [] SCDs [X] hold chemoprophylaxis due to fresh post op [] high risk of DVT/PE on admission due to:    ID: periop abx    MISC: jose roberto mullins    SOCIAL/FAMILY:  [] awaiting [] updated at bedside [] family meeting    CODE STATUS:  [X] Full Code [] DNR [] DNI [] Palliative/Comfort Care    DISPOSITION:  [X] ICU [] Stroke Unit [] Floor [] EMU [] RCU [] PCU    [X] Patient is at high risk of neurologic deterioration/death due to: infection, post operative hemorrhage, brain compression, SDH reaccumulation, seizure    Time seen: 1100  Time spent: 30 [X] critical care minutes ASSESSMENT/PLAN:  atraumatic SDH s/p mary grace hole evacuation    NEURO: Neurochecks q1h, keppra sz ppx to end 8/19, analgesia prn,   Activity: [] mobilize as tolerated [] Bedrest [] PT [] OT [] PMNR    PULM: NRB for pneumocephalus    CV: home antihypertensive regimen, TTE  SBP goal 100-150    RENAL: continue IVF until po intake suficient then IVL  Fluids: NS @50ml/hr    GI: famotidine  Diet: CCD diet  GI prophylaxis [] not indicated [] PPI [] other:  Bowel regimen [] colace [] senna [] other:    ENDO: insulin sliding scale  Goal euglycemia (-180)    HEME/ONC:   VTE prophylaxis: [] SCDs [X] hold chemoprophylaxis due to fresh post op [] high risk of DVT/PE on admission due to:    ID: periop abx    MISC: jose roberto mullins    SOCIAL/FAMILY:  [] awaiting [] updated at bedside [] family meeting    CODE STATUS:  [X] Full Code [] DNR [] DNI [] Palliative/Comfort Care    DISPOSITION:  [X] ICU [] Stroke Unit [] Floor [] EMU [] RCU [] PCU    [X] Patient is at high risk of neurologic deterioration/death due to: infection, post operative hemorrhage, brain compression, SDH reaccumulation, seizure    Time seen: 1100am  Time spent: 30 critical care minutes

## 2017-08-12 NOTE — H&P ADULT - HISTORY OF PRESENT ILLNESS
57M with HTN, HLD, DM presents with 3 days of difficulty walking. He went to [facility] where CT head showed large right sided subacute SDH with 1.5cm MLS. He denies history of head trauma. He denies antiplatelet/anticoagulation use. He has severe difficulty walking even with assistance. He was complaining of headache at [facility] which was treated with benadryl, decadron, and reglan however he does not currently complain of headache. He is awake and appropriately responsive although at times seems to fall asleep which he attributes to the benadryl. Notably, he states that he had left sided hematoma that was treated non-operatively at [facility]. It is now not apparent on the CT head done tday.

## 2017-08-12 NOTE — ED PROVIDER NOTE - ATTENDING CONTRIBUTION TO CARE
57y Male on ASA BIBEMS for R Subdural hematoma ~2.5cm, 1.5cm shift no trauma sent as transfer with l sided weakness, awake and alert, seen by NS for admission for OR to sicu, preop, repeat cth, load on keppra.

## 2017-08-12 NOTE — ED ADULT NURSE REASSESSMENT NOTE - NS ED NURSE REASSESS COMMENT FT1
At around 0500, per Neurosurgery Resident, REECE Reed, he wants systolic at about 200 so cardene drip stopped.

## 2017-08-12 NOTE — H&P ADULT - NSHPPHYSICALEXAM_GEN_ALL_CORE
drowsy but easily arousable, oriented x3, PERRL, EOMI, no facial, palate elevation symmetric, tongue midline, shrug 5/5  right side 5/5  left side 4+/5 with left drift  SILT  no clonus or Babinski

## 2017-08-12 NOTE — ED PROVIDER NOTE - PHYSICAL EXAMINATION
PE: CONSTITUTIONAL: Nontoxic, in no apparent distress. ENMT: Airway patent, nasal mucosa clear, mouth with normal mucosa. HEAD: NCAT EYES: PERRL, EOMI bilaterally CARDIAC: RRR, no m/r/g, no pedal edema RESPIRATORY: CTA bilaterally, no adventitious sounds GI: Abdomen non-distended, non-tender MSK: Spine appears normal, range of motion is not limited, no muscle/joint tenderness NEURO: CNII-XII grossly intact, 5/5 strength, full sensation all extremities, gait not tested SKIN: Skin tone normal in color, warm and dry. No evidence of rash.

## 2017-08-12 NOTE — ED ADULT NURSE REASSESSMENT NOTE - NS ED NURSE REASSESS COMMENT FT1
At around 0630, reported to KARMEN Casper @ OR. Per Pennie, pt. [facility] ashley Soto rn, aware.

## 2017-08-12 NOTE — CONSULT NOTE ADULT - ASSESSMENT
64 yo male with poorly controlled type 2 diabetes and overweight after procedure for subdural hematoma this morning 56 yo male with poorly controlled type 2 diabetes and overweight after procedure for subdural hematoma this morning

## 2017-08-12 NOTE — PROGRESS NOTE ADULT - SUBJECTIVE AND OBJECTIVE BOX
57M with HTN, HLD, DM presents with 3 days of difficulty walking. He went to [facility] where CT head showed large right sided subacute SDH with 1.5cm MLS. He denies history of head trauma. He denies antiplatelet/anticoagulation use. He has severe difficulty walking even with assistance. He was complaining of headache at [facility] which was treated with benadryl, decadron, and reglan however he does not currently complain of headache. He is awake and appropriately responsive although at times seems to fall asleep which he attributes to the benadryl. Notably, he states that he had left sided hematoma that was treated non-operatively at [facility]. Transferred underwent R mary grace hole placement, post op CT demonstrated significant hydrocephalus.     ROS: negative [] unable to obtain as patient is comatose/intubated/aphasic []   VITALS:   T(C): 36.6 (08-12-17 @ 09:05), Max: 36.7 (08-11-17 @ 20:32)  HR: 74 (08-12-17 @ 10:00) (61 - 92)  BP: 175/125 (08-12-17 @ 05:45) (131/102 - 175/125)  RR: 20 (08-12-17 @ 10:00) (16 - 37)  SpO2: 100% (08-12-17 @ 10:00) (93% - 100%)    08-12-17 @ 07:01  -  08-12-17 @ 10:39  --------------------------------------------------------  IN: 255 mL / OUT: 235 mL / NET: 20 mL      LABS:  ABG - ( 12 Aug 2017 08:23 )  pH: 7.53  /  pCO2: 31    /  pO2: 190   / HCO3: 26    / Base Excess: 4.4   /  SaO2: 100                                     16.4   11.7  )-----------( 293      ( 12 Aug 2017 09:43 )             47.7     08-12    140  |  101  |  15  ----------------------------<  228<H>  3.7   |  25  |  1.01    Ca    8.9      12 Aug 2017 09:43    TPro  7.3  /  Alb  3.8  /  TBili  1.2  /  DBili  x   /  AST  38  /  ALT  47<H>  /  AlkPhos  98  08-12    PT/INR - ( 11 Aug 2017 23:24 )   PT: 11.9 sec;   INR: 1.09 ratio         PTT - ( 11 Aug 2017 23:24 )  PTT:33.3 sec  MEDS:  MEDICATIONS  (STANDING):  sodium chloride 0.9% with potassium chloride 20 mEq/L 1000 milliLiter(s) (85 mL/Hr) IV Continuous <Continuous>  levETIRAcetam 500 milliGRAM(s) Oral every 12 hours  famotidine    Tablet 20 milliGRAM(s) Oral every 12 hours  clindamycin IVPB 900 milliGRAM(s) IV Intermittent every 8 hours      [All pertinent recent Imaging/Reports reviewed]    DEVICES: [] Restraints [] ROSIE/HMV []LD [] ET tube [] Trach [] A-line [] Mendez [] NGT [] Rectal Tube     General: well apprearing, no acute distress  Neurological: alert, oriented x3, language intact, perrl, eomi, face=, tup midline, 5/5 throughout, sensation intact to light touch, no dysmetria.   CVS: rrr  Pulm: ctabl  Abd: soft/nt/nd  Ext: warm, well perfused  Skin: no rashes 57M with HTN, HLD, DM presents with 3 days of difficulty walking. He went to [facility] where CT head showed large right sided subacute SDH with 1.5cm MLS. He denies history of head trauma. He denies antiplatelet/anticoagulation use. He has severe difficulty walking even with assistance. He was complaining of headache at [facility] which was treated with benadryl, decadron, and reglan however he does not currently complain of headache. He is awake and appropriately responsive although at times seems to fall asleep which he attributes to the benadryl. Notably, he states that he had left sided hematoma that was treated non-operatively at [facility]. Transferred underwent R mary grace hole placement, post op CT demonstrated significant hydrocephalus.     ROS: negative    VITALS:   T(C): 36.6 (08-12-17 @ 09:05), Max: 36.7 (08-11-17 @ 20:32)  HR: 74 (08-12-17 @ 10:00) (61 - 92)  BP: 175/125 (08-12-17 @ 05:45) (131/102 - 175/125)  RR: 20 (08-12-17 @ 10:00) (16 - 37)  SpO2: 100% (08-12-17 @ 10:00) (93% - 100%)    08-12-17 @ 07:01  -  08-12-17 @ 10:39  --------------------------------------------------------  IN: 255 mL / OUT: 235 mL / NET: 20 mL      LABS:  ABG - ( 12 Aug 2017 08:23 )  pH: 7.53  /  pCO2: 31    /  pO2: 190   / HCO3: 26    / Base Excess: 4.4   /  SaO2: 100                                     16.4   11.7  )-----------( 293      ( 12 Aug 2017 09:43 )             47.7     08-12    140  |  101  |  15  ----------------------------<  228<H>  3.7   |  25  |  1.01    Ca    8.9      12 Aug 2017 09:43    TPro  7.3  /  Alb  3.8  /  TBili  1.2  /  DBili  x   /  AST  38  /  ALT  47<H>  /  AlkPhos  98  08-12    PT/INR - ( 11 Aug 2017 23:24 )   PT: 11.9 sec;   INR: 1.09 ratio         PTT - ( 11 Aug 2017 23:24 )  PTT:33.3 sec  MEDS:  MEDICATIONS  (STANDING):  sodium chloride 0.9% with potassium chloride 20 mEq/L 1000 milliLiter(s) (85 mL/Hr) IV Continuous <Continuous>  levETIRAcetam 500 milliGRAM(s) Oral every 12 hours  famotidine    Tablet 20 milliGRAM(s) Oral every 12 hours  clindamycin IVPB 900 milliGRAM(s) IV Intermittent every 8 hours      [All pertinent recent Imaging/Reports reviewed]    DEVICES: [] Restraints [] ROSIE/HMV []LD [] ET tube [] Trach [] A-line [] Mendez [] NGT [] Rectal Tube     General: well apprearing, no acute distress  Neurological: alert, oriented x3, language intact, perrl, eomi, face=, tup midline, 5/5 throughout, sensation intact to light touch, no dysmetria.   CVS: rrr  Pulm: ctabl  Abd: soft/nt/nd  Ext: warm, well perfused  Skin: no rashes

## 2017-08-12 NOTE — H&P ADULT - ASSESSMENT
57M with no history of trauma p/w 2.5cm subacute SDH with 1.5cm midline shift with difficulty walking for 3 days. He also has left sided weakness as a result of hematoma.     Admit to NSCU  Repeat CT head  Keppra 1g  Pre-op labs  NPO for OR for mary grace hole for evacuation for SDH  Discussed with patient result of CT scan and further management. Discussed risks, benefits, alternatives of surgery with patient. All questions answered.

## 2017-08-12 NOTE — ED ADULT NURSE NOTE - OBJECTIVE STATEMENT
Pt. is a 56 yo male who is a transfer from [facility] with a dx of SDH. Per EMS, pt. presented to [place] ED yesterday c/o headache. Upon performing a CT, pt. found to have a 2.5 mm R- sided SDH. He does have a hx of L-sided SDH from last year.

## 2017-08-12 NOTE — ED PROVIDER NOTE - OBJECTIVE STATEMENT
57y Male on ASA BIBEMS for R Subdural hematoma ~2.5cm, 1.5cm shift. 57y Male PMH HTN, DM2, previous CVA not on ASA (noncompliant) BIBEMS for R Subdural hematoma ~2.5cm, 1.5cm shift. Noted to have SDH at [facility] 2/2 fall several months ago, monitored without intervention. Main complaints of headache, weakness, abdominal pain and difficulty walking. CTAP unremarkable, but CT head with above SDH.

## 2017-08-12 NOTE — CONSULT NOTE ADULT - SUBJECTIVE AND OBJECTIVE BOX
HPI:  57M with HTN, HLD, DM presents with 3 days of difficulty walking. He went to [facility] where CT head showed large right sided subacute SDH with 1.5cm MLS. He denies history of head trauma. He denies antiplatelet/anticoagulation use. He has severe difficulty walking even with assistance. He was complaining of headache at [facility] which was treated with benadryl, decadron, and reglan however he does not currently complain of headache. He is awake and appropriately responsive although at times seems to fall asleep which he attributes to the benadryl. Notably, he states that he had left sided hematoma that was treated non-operatively at [facility]. It is now not apparent on the CT head done tday. (12 Aug 2017 05:19)    endocrine: pt was transferred from [facility] for subdural surgery. Endocrine was called for evaluation of type 2 diabetes. He was diagnosed in 2006. Pt oriented to place and time but was also confused. He says he has endocrinologist but could not remember the name. He takes lantus and novolog before meals but could not remember the doses, I tried to call pharm but it was closed. Checks fs and mostly 100-200.   unable to describe diet and exercise  He says he saw eye doc last year was not told of retinopathy. No known heart disease. Has neuropathy  He has good appetite and had his lunch. Had procedure this morning  his brother and sister in law at bedside      PAST MEDICAL & SURGICAL HISTORY:  High cholesterol  Hypertension  Diabetes mellitus  Abrasion of right middle finger  Acute medial meniscus tear of left knee  History of appendectomy    FAMILY HISTORY:  uncle with type 2 diabetes    Social History:  no smoking and alcohol    Outpatient Medications:    MEDICATIONS  (STANDING):  sodium chloride 0.9% with potassium chloride 20 mEq/L 1000 milliLiter(s) (50 mL/Hr) IV Continuous <Continuous>  levETIRAcetam 500 milliGRAM(s) Oral every 12 hours  famotidine    Tablet 20 milliGRAM(s) Oral every 12 hours  clindamycin IVPB 900 milliGRAM(s) IV Intermittent every 8 hours  amLODIPine   Tablet 5 milliGRAM(s) Oral daily  losartan 25 milliGRAM(s) Oral daily  dextrose 5%. 1000 milliLiter(s) (50 mL/Hr) IV Continuous <Continuous>  dextrose 50% Injectable 12.5 Gram(s) IV Push once  dextrose 50% Injectable 25 Gram(s) IV Push once  dextrose 50% Injectable 25 Gram(s) IV Push once  insulin glargine Injectable (LANTUS) 20 Unit(s) SubCutaneous at bedtime  insulin lispro Injectable (HumaLOG) 7 Unit(s) SubCutaneous three times a day before meals  insulin lispro (HumaLOG) corrective regimen sliding scale   SubCutaneous three times a day before meals  insulin lispro (HumaLOG) corrective regimen sliding scale   SubCutaneous at bedtime    MEDICATIONS  (PRN):  acetaminophen   Tablet 650 milliGRAM(s) Oral every 6 hours PRN For Temp greater than 38 C (100.4 F)  acetaminophen   Tablet. 650 milliGRAM(s) Oral every 6 hours PRN Mild Pain (1 - 3)  oxyCODONE    5 mG/acetaminophen 325 mG 1 Tablet(s) Oral every 4 hours PRN Moderate Pain  ondansetron Injectable 4 milliGRAM(s) IV Push every 6 hours PRN Nausea and/or Vomiting  oxyCODONE    5 mG/acetaminophen 325 mG 2 Tablet(s) Oral every 4 hours PRN Severe Pain  dextrose Gel 1 Dose(s) Oral once PRN Blood Glucose LESS THAN 70 milliGRAM(s)/deciliter  glucagon  Injectable 1 milliGRAM(s) IntraMuscular once PRN Glucose LESS THAN 70 milligrams/deciliter      Allergies    ampicillin (Short breath)  penicillin (Short breath)  Toprol-XL (Other)    Intolerances      Review of Systems:  Constitutional: No fever  Eyes: No blurry vision  Neuro: No tremors  HEENT: No pain  Cardiovascular: No chest pain, palpitations  Respiratory: No SOB, no cough  GI: No nausea, vomiting, abdominal pain  : No dysuria  Skin: no rash  Psych: no depression  Endocrine: no polyuria, polydipsia  Hem/lymph: no swelling  Osteoporosis: no fractures    ALL OTHER SYSTEMS REVIEWED AND NEGATIVE    UNABLE TO OBTAIN    PHYSICAL EXAM:  VITALS: T(C): 36.8 (08-12-17 @ 15:00)  T(F): 98.2 (08-12-17 @ 15:00), Max: 98.2 (08-12-17 @ 11:00)  HR: 101 (08-12-17 @ 15:00) (61 - 104)  BP: 175/125 (08-12-17 @ 05:45) (131/102 - 175/125)  RR:  (11 - 37)  SpO2:  (93% - 100%)  Wt(kg): --  GENERAL: NAD, well-groomed, well-developed  EYES: No proptosis, no lid lag, anicteric  HEENT:  Atraumatic, Normocephalic, moist mucous membranes. has drain right side of head  THYROID: Normal size, no palpable nodules  RESPIRATORY: Clear to auscultation bilaterally; No rales, rhonchi, wheezing, or rubs  CARDIOVASCULAR: Regular rate and rhythm; No murmurs; no peripheral edema  GI: Soft, nontender, non distended, normal bowel sounds  SKIN: Dry, intact, No rashes or lesions  MUSCULOSKELETAL: Full range of motion, normal strength  NEURO: sensation intact, extraocular movements intact, no tremor, normal reflexes  PSYCH: Alert and oriented x 3, normal affect, normal mood  CUSHING'S SIGNS: no striae    CAPILLARY BLOOD GLUCOSE  332 (08-12 @ 17:07)  228 (08-12 @ 11:00)  141 (08-11 @ 23:27)                        16.4   11.7  )-----------( 293      ( 12 Aug 2017 09:43 )             47.7       08-12    140  |  101  |  15  ----------------------------<  228<H>  3.7   |  25  |  1.01    EGFR if : 95  EGFR if non : 82    Ca    8.9      08-12    TPro  7.3  /  Alb  3.8  /  TBili  1.2  /  DBili  x   /  AST  38  /  ALT  47<H>  /  AlkPhos  98  08-12      Thyroid Function Tests:      Hemoglobin A1C, Whole Blood: 7.5 % <H> [4.0 - 5.6] (08-12-17 @ 15:05)      08-12 Chol 177  HDL 43 Trig 88    Radiology:

## 2017-08-13 LAB
ANION GAP SERPL CALC-SCNC: 12 MMOL/L — SIGNIFICANT CHANGE UP (ref 5–17)
BUN SERPL-MCNC: 23 MG/DL — SIGNIFICANT CHANGE UP (ref 7–23)
CALCIUM SERPL-MCNC: 8.5 MG/DL — SIGNIFICANT CHANGE UP (ref 8.4–10.5)
CHLORIDE SERPL-SCNC: 101 MMOL/L — SIGNIFICANT CHANGE UP (ref 96–108)
CO2 SERPL-SCNC: 26 MMOL/L — SIGNIFICANT CHANGE UP (ref 22–31)
CREAT SERPL-MCNC: 0.96 MG/DL — SIGNIFICANT CHANGE UP (ref 0.5–1.3)
GLUCOSE SERPL-MCNC: 192 MG/DL — HIGH (ref 70–99)
HCT VFR BLD CALC: 44.5 % — SIGNIFICANT CHANGE UP (ref 39–50)
HGB BLD-MCNC: 15.5 G/DL — SIGNIFICANT CHANGE UP (ref 13–17)
MAGNESIUM SERPL-MCNC: 1.8 MG/DL — SIGNIFICANT CHANGE UP (ref 1.6–2.6)
MCHC RBC-ENTMCNC: 30.3 PG — SIGNIFICANT CHANGE UP (ref 27–34)
MCHC RBC-ENTMCNC: 34.8 GM/DL — SIGNIFICANT CHANGE UP (ref 32–36)
MCV RBC AUTO: 87.1 FL — SIGNIFICANT CHANGE UP (ref 80–100)
PHOSPHATE SERPL-MCNC: 2.8 MG/DL — SIGNIFICANT CHANGE UP (ref 2.5–4.5)
PLATELET # BLD AUTO: 297 K/UL — SIGNIFICANT CHANGE UP (ref 150–400)
POTASSIUM SERPL-MCNC: 3.4 MMOL/L — LOW (ref 3.5–5.3)
POTASSIUM SERPL-SCNC: 3.4 MMOL/L — LOW (ref 3.5–5.3)
RBC # BLD: 5.12 M/UL — SIGNIFICANT CHANGE UP (ref 4.2–5.8)
RBC # FLD: 13.7 % — SIGNIFICANT CHANGE UP (ref 10.3–14.5)
SODIUM SERPL-SCNC: 139 MMOL/L — SIGNIFICANT CHANGE UP (ref 135–145)
WBC # BLD: 15.5 K/UL — HIGH (ref 3.8–10.5)
WBC # FLD AUTO: 15.5 K/UL — HIGH (ref 3.8–10.5)

## 2017-08-13 PROCEDURE — 70450 CT HEAD/BRAIN W/O DYE: CPT | Mod: 26

## 2017-08-13 PROCEDURE — 99291 CRITICAL CARE FIRST HOUR: CPT

## 2017-08-13 PROCEDURE — 99233 SBSQ HOSP IP/OBS HIGH 50: CPT

## 2017-08-13 RX ORDER — POTASSIUM CHLORIDE 20 MEQ
40 PACKET (EA) ORAL ONCE
Qty: 0 | Refills: 0 | Status: COMPLETED | OUTPATIENT
Start: 2017-08-13 | End: 2017-08-14

## 2017-08-13 RX ORDER — INSULIN GLARGINE 100 [IU]/ML
23 INJECTION, SOLUTION SUBCUTANEOUS AT BEDTIME
Qty: 0 | Refills: 0 | Status: DISCONTINUED | OUTPATIENT
Start: 2017-08-13 | End: 2017-08-14

## 2017-08-13 RX ORDER — AMLODIPINE BESYLATE 2.5 MG/1
10 TABLET ORAL DAILY
Qty: 0 | Refills: 0 | Status: DISCONTINUED | OUTPATIENT
Start: 2017-08-13 | End: 2017-08-16

## 2017-08-13 RX ORDER — INSULIN LISPRO 100/ML
8 VIAL (ML) SUBCUTANEOUS
Qty: 0 | Refills: 0 | Status: DISCONTINUED | OUTPATIENT
Start: 2017-08-13 | End: 2017-08-16

## 2017-08-13 RX ORDER — MAGNESIUM SULFATE 500 MG/ML
2 VIAL (ML) INJECTION ONCE
Qty: 0 | Refills: 0 | Status: COMPLETED | OUTPATIENT
Start: 2017-08-13 | End: 2017-08-13

## 2017-08-13 RX ADMIN — Medication 8 UNIT(S): at 18:28

## 2017-08-13 RX ADMIN — FAMOTIDINE 20 MILLIGRAM(S): 10 INJECTION INTRAVENOUS at 05:37

## 2017-08-13 RX ADMIN — LEVETIRACETAM 500 MILLIGRAM(S): 250 TABLET, FILM COATED ORAL at 05:37

## 2017-08-13 RX ADMIN — OXYCODONE AND ACETAMINOPHEN 1 TABLET(S): 5; 325 TABLET ORAL at 06:00

## 2017-08-13 RX ADMIN — Medication 2: at 09:31

## 2017-08-13 RX ADMIN — Medication 50 GRAM(S): at 03:07

## 2017-08-13 RX ADMIN — AMLODIPINE BESYLATE 5 MILLIGRAM(S): 2.5 TABLET ORAL at 05:37

## 2017-08-13 RX ADMIN — Medication 7 UNIT(S): at 09:32

## 2017-08-13 RX ADMIN — OXYCODONE AND ACETAMINOPHEN 2 TABLET(S): 5; 325 TABLET ORAL at 19:35

## 2017-08-13 RX ADMIN — OXYCODONE AND ACETAMINOPHEN 2 TABLET(S): 5; 325 TABLET ORAL at 19:42

## 2017-08-13 RX ADMIN — Medication 100 MILLIGRAM(S): at 05:38

## 2017-08-13 RX ADMIN — Medication 7 UNIT(S): at 13:28

## 2017-08-13 RX ADMIN — FAMOTIDINE 20 MILLIGRAM(S): 10 INJECTION INTRAVENOUS at 18:28

## 2017-08-13 RX ADMIN — LEVETIRACETAM 500 MILLIGRAM(S): 250 TABLET, FILM COATED ORAL at 18:28

## 2017-08-13 RX ADMIN — AMLODIPINE BESYLATE 10 MILLIGRAM(S): 2.5 TABLET ORAL at 21:30

## 2017-08-13 RX ADMIN — LOSARTAN POTASSIUM 25 MILLIGRAM(S): 100 TABLET, FILM COATED ORAL at 05:37

## 2017-08-13 RX ADMIN — INSULIN GLARGINE 23 UNIT(S): 100 INJECTION, SOLUTION SUBCUTANEOUS at 21:29

## 2017-08-13 RX ADMIN — ENOXAPARIN SODIUM 40 MILLIGRAM(S): 100 INJECTION SUBCUTANEOUS at 21:30

## 2017-08-13 RX ADMIN — Medication 2: at 13:28

## 2017-08-13 RX ADMIN — OXYCODONE AND ACETAMINOPHEN 1 TABLET(S): 5; 325 TABLET ORAL at 05:37

## 2017-08-13 NOTE — PROGRESS NOTE ADULT - SUBJECTIVE AND OBJECTIVE BOX
57M with HTN, HLD, DM presents with 3 days of difficulty walking. He went to [facility] where CT head showed large right sided subacute SDH with 1.5cm MLS. He denies history of head trauma. He denies antiplatelet/anticoagulation use. He has severe difficulty walking even with assistance. He was complaining of headache at [facility] which was treated with benadryl, decadron, and reglan however he does not currently complain of headache. He is awake and appropriately responsive although at times seems to fall asleep which he attributes to the benadryl. Notably, he states that he had left sided hematoma that was treated non-operatively at [facility]. Transferred underwent R mary grace hole placement, post op CT demonstrated significant hydrocephalus.     Pt concerned for brain damage and eligibility for social security. No other complaints.    No overnight events.    VITALS:   T(C): 36.6 (08-13-17 @ 11:00), Max: 37 (08-12-17 @ 23:00)  HR: 95 (08-13-17 @ 11:00) (83 - 110)  BP: 138/91 (08-13-17 @ 11:00) (138/91 - 138/91)  RR: 17 (08-13-17 @ 11:00) (15 - 34)  SpO2: 99% (08-13-17 @ 11:00) (89% - 100%)    08-12-17 @ 07:01  -  08-13-17 @ 07:00  --------------------------------------------------------  IN: 1530 mL / OUT: 1915 mL / NET: -385 mL    08-13-17 @ 07:01  -  08-13-17 @ 11:26  --------------------------------------------------------  IN: 50 mL / OUT: 150 mL / NET: -100 mL      LABS:  ABG - ( 12 Aug 2017 08:23 )  pH: 7.53  /  pCO2: 31    /  pO2: 190   / HCO3: 26    / Base Excess: 4.4   /  SaO2: 100                           15.0   20.7  )-----------( 333      ( 12 Aug 2017 22:24 )             44.8     141  |  100  |  21  ----------------------------<  254<H>  3.8   |  27  |  1.25    Ca    8.5      12 Aug 2017 22:24  Phos  2.9     08-12  Mg     1.4     08-12    TPro  7.3  /  Alb  3.8  /  TBili  1.2  /  DBili  x   /  AST  38  /  ALT  47<H>  /  AlkPhos  98  08-12    PT/INR - ( 11 Aug 2017 23:24 )   PT: 11.9 sec;   INR: 1.09 ratio       PTT - ( 11 Aug 2017 23:24 )  PTT:33.3 sec    MEDICATIONS  (STANDING):  levETIRAcetam 500 milliGRAM(s) Oral every 12 hours  famotidine    Tablet 20 milliGRAM(s) Oral every 12 hours  enoxaparin Injectable 40 milliGRAM(s) SubCutaneous at bedtime  losartan 25 milliGRAM(s) Oral daily  dextrose 50% Injectable 12.5 Gram(s) IV Push once  dextrose 50% Injectable 25 Gram(s) IV Push once  dextrose 50% Injectable 25 Gram(s) IV Push once  insulin glargine Injectable (LANTUS) 20 Unit(s) SubCutaneous at bedtime  insulin lispro Injectable (HumaLOG) 7 Unit(s) SubCutaneous three times a day before meals  insulin lispro (HumaLOG) corrective regimen sliding scale   SubCutaneous three times a day before meals  insulin lispro (HumaLOG) corrective regimen sliding scale   SubCutaneous at bedtime  amLODIPine   Tablet 10 milliGRAM(s) Oral daily    [All pertinent recent Imaging/Reports reviewed]    DEVICES: [] Restraints [x] ROSIE/HMV []LD [] ET tube [] Trach [x] A-line [x Mendez [] NGT [] Rectal Tube     General: well apprearing, no acute distress  Neurological: alert, oriented x3, language intact, perrl, eomi, face=, tup midline, 5/5 throughout, sensation intact to light touch, no dysmetria.   CVS: rrr  Pulm: ctabl  Abd: soft/nt/nd  Ext: warm, well perfused  Skin: no rashes

## 2017-08-13 NOTE — PROGRESS NOTE ADULT - PROBLEM SELECTOR PLAN 1
pt with improved finger sticks close to target  can increase lantus 23 units at bedtime and humalog 8 units before meals and cont moderate sliding scale  on discharge need to clarify if has endocrinologist and he was on lantus and oral at home

## 2017-08-13 NOTE — PROGRESS NOTE ADULT - ASSESSMENT
57M POD#1 s/p R mary grace hole for evacuation of SDH. Patient remains neurologically intact w/ minimal HA.    -q1 neuro check  -CTH in am  -f/u ROSIE output; d/c drain if continues to put out nothing  -pain control  -PT/OT

## 2017-08-13 NOTE — PROGRESS NOTE ADULT - ASSESSMENT
ASSESSMENT/PLAN:  atraumatic SDH s/p mary grace hole evacuation POD 1    NEURO: Neurochecks q4h, CTH this am w/ improved pneumocephalus, cont ROSIE subdural drain till tmrw per neurosx, keppra sz ppx to end 8/19, analgesia prn,   Activity: [x] mobilize as tolerated [] Bedrest [x] PT [x] OT [] PMNR    PULM: O2sat<92, IS    CV: home antihypertensive regimen, TTE; D/C a-line  SBP goal 100-150    RENAL: IVL, D/C mullins    GI: famotidine  Diet: CCD diet  GI prophylaxis [] not indicated [] PPI [] other:  Bowel regimen [] colace [] senna [] other:    ENDO: insulin sliding scale  Goal euglycemia (-180)    HEME/ONC:   VTE prophylaxis: [] SCDs [X]  chemoprophylaxis on lovenox [] high risk of DVT/PE on admission due to:    ID: afebrile    MISC: dc susanna    SOCIAL/FAMILY:  [] awaiting [] updated at bedside [] family meeting    CODE STATUS:  [X] Full Code [] DNR [] DNI [] Palliative/Comfort Care    DISPOSITION:  [X] ICU [] Stroke Unit [] Floor [] EMU [] RCU [] PCU    [X] Patient is at high risk of neurologic deterioration/death due to: infection, post operative hemorrhage, brain compression, SDH reaccumulation, seizure ASSESSMENT/PLAN:  atraumatic SDH s/p mary grace hole evacuation POD 1    NEURO: Neurochecks q4h, CTH this am w/ improved pneumocephalus, cont ROSIE subdural drain till tmrw per neurosx, keppra sz ppx to end 8/19, analgesia prn,   Activity: [x] mobilize as tolerated [] Bedrest [x] PT [x] OT [] PMNR    PULM: O2sat<92, IS    CV: home antihypertensive regimen, TTE; D/C a-line  SBP goal 100-150    RENAL: IVL, D/C mullins    GI: famotidine  Diet: CCD diet  GI prophylaxis [] not indicated [] PPI [] other:  Bowel regimen [] colace [] senna [] other:    ENDO: insulin sliding scale  Goal euglycemia (-180)    HEME/ONC:   VTE prophylaxis: [] SCDs [X]  chemoprophylaxis on lovenox [] high risk of DVT/PE on admission due to:    ID: afebrile    MISC: dc susanna    SOCIAL/FAMILY:  [] awaiting [] updated at bedside [] family meeting    CODE STATUS:  [X] Full Code [] DNR [] DNI [] Palliative/Comfort Care    DISPOSITION:  [X] ICU [] Stroke Unit [] Floor [] EMU [] RCU [] PCU    critical care time 45 minutes dedicated to this patient at risk of neurological deterioration or death due to : infection, post operative hemorrhage, brain compression, SDH reaccumulation, seizure

## 2017-08-13 NOTE — PROGRESS NOTE ADULT - SUBJECTIVE AND OBJECTIVE BOX
Visit Summary: Patient seen and evaluated at bedside. He remains neurologically stable. Subdural ROSIE stopped putting out overnight despite stripping of the drain.    Overnight Events: No acute events o/n.    Exam:  T(C): 36.8 (08-13-17 @ 03:00), Max: 37 (08-12-17 @ 23:00)  HR: 97 (08-13-17 @ 05:00) (74 - 110)  BP: 175/125 (08-12-17 @ 05:45) (175/125 - 175/125)  RR: 17 (08-13-17 @ 05:00) (11 - 37)  SpO2: 94% (08-13-17 @ 05:00) (92% - 100%)  Wt(kg): --    AAOx3, EOS, FC  PERRL, EOMI  BOLDEN 5/5, no drift  SILT throughout                        15.0   20.7  )-----------( 333      ( 12 Aug 2017 22:24 )             44.8     08-12    141  |  100  |  21  ----------------------------<  254<H>  3.8   |  27  |  1.25    Ca    8.5      12 Aug 2017 22:24  Phos  2.9     08-12  Mg     1.4     08-12    TPro  7.3  /  Alb  3.8  /  TBili  1.2  /  DBili  x   /  AST  38  /  ALT  47<H>  /  AlkPhos  98  08-12  PT/INR - ( 11 Aug 2017 23:24 )   PT: 11.9 sec;   INR: 1.09 ratio         PTT - ( 11 Aug 2017 23:24 )  PTT:33.3 sec

## 2017-08-13 NOTE — PROGRESS NOTE ADULT - SUBJECTIVE AND OBJECTIVE BOX
pt feels ok has good apetite    MEDICATIONS  (STANDING):  levETIRAcetam 500 milliGRAM(s) Oral every 12 hours  famotidine    Tablet 20 milliGRAM(s) Oral every 12 hours  enoxaparin Injectable 40 milliGRAM(s) SubCutaneous at bedtime  losartan 25 milliGRAM(s) Oral daily  dextrose 50% Injectable 12.5 Gram(s) IV Push once  dextrose 50% Injectable 25 Gram(s) IV Push once  dextrose 50% Injectable 25 Gram(s) IV Push once  insulin lispro (HumaLOG) corrective regimen sliding scale   SubCutaneous three times a day before meals  insulin lispro (HumaLOG) corrective regimen sliding scale   SubCutaneous at bedtime  amLODIPine   Tablet 10 milliGRAM(s) Oral daily  insulin glargine Injectable (LANTUS) 23 Unit(s) SubCutaneous at bedtime  insulin lispro Injectable (HumaLOG) 8 Unit(s) SubCutaneous three times a day before meals    PHYSICAL EXAM:  VITALS: T(C): 36.6 (08-13-17 @ 15:00)  T(F): 97.8 (08-13-17 @ 15:00), Max: 98.6 (08-12-17 @ 23:00)  HR: 96 (08-13-17 @ 15:00) (84 - 110)  BP: 138/85 (08-13-17 @ 14:00) (134/116 - 138/91)  RR:  (15 - 34)  SpO2:  (89% - 100%)  Wt(kg): --  GENERAL: NAD, well-groomed, well-developed  RESPIRATORY: Clear to auscultation bilaterally; No rales, rhonchi, wheezing, or rubs  CARDIOVASCULAR: Regular rate and rhythm; No murmurs; no peripheral edema  GI: Soft, nontender, non distended, normal bowel sounds    CAPILLARY BLOOD GLUCOSE  198 (08-13 @ 13:00)  159 (08-13 @ 08:00)  332 (08-12 @ 17:07)  228 (08-12 @ 11:00)  141 (08-11 @ 23:27)      08-12    141  |  100  |  21  ----------------------------<  254<H>  3.8   |  27  |  1.25    EGFR if : 74  EGFR if non : 64    Ca    8.5      08-12  Mg     1.4     08-12  Phos  2.9     08-12    TPro  7.3  /  Alb  3.8  /  TBili  1.2  /  DBili  x   /  AST  38  /  ALT  47<H>  /  AlkPhos  98  08-12          Thyroid Function Tests:      Hemoglobin A1C, Whole Blood: 7.5 % <H> [4.0 - 5.6] (08-12-17 @ 15:05)

## 2017-08-14 PROCEDURE — 99232 SBSQ HOSP IP/OBS MODERATE 35: CPT

## 2017-08-14 PROCEDURE — 70450 CT HEAD/BRAIN W/O DYE: CPT | Mod: 26

## 2017-08-14 PROCEDURE — 99233 SBSQ HOSP IP/OBS HIGH 50: CPT

## 2017-08-14 RX ORDER — INSULIN GLARGINE 100 [IU]/ML
19 INJECTION, SOLUTION SUBCUTANEOUS AT BEDTIME
Qty: 0 | Refills: 0 | Status: DISCONTINUED | OUTPATIENT
Start: 2017-08-14 | End: 2017-08-16

## 2017-08-14 RX ORDER — ONDANSETRON 8 MG/1
4 TABLET, FILM COATED ORAL ONCE
Qty: 0 | Refills: 0 | Status: COMPLETED | OUTPATIENT
Start: 2017-08-14 | End: 2017-08-14

## 2017-08-14 RX ADMIN — Medication 8 UNIT(S): at 18:46

## 2017-08-14 RX ADMIN — LOSARTAN POTASSIUM 25 MILLIGRAM(S): 100 TABLET, FILM COATED ORAL at 05:53

## 2017-08-14 RX ADMIN — AMLODIPINE BESYLATE 10 MILLIGRAM(S): 2.5 TABLET ORAL at 22:27

## 2017-08-14 RX ADMIN — Medication 2: at 14:12

## 2017-08-14 RX ADMIN — LEVETIRACETAM 500 MILLIGRAM(S): 250 TABLET, FILM COATED ORAL at 05:53

## 2017-08-14 RX ADMIN — LEVETIRACETAM 500 MILLIGRAM(S): 250 TABLET, FILM COATED ORAL at 18:45

## 2017-08-14 RX ADMIN — Medication 40 MILLIEQUIVALENT(S): at 05:53

## 2017-08-14 RX ADMIN — FAMOTIDINE 20 MILLIGRAM(S): 10 INJECTION INTRAVENOUS at 22:27

## 2017-08-14 RX ADMIN — ONDANSETRON 4 MILLIGRAM(S): 8 TABLET, FILM COATED ORAL at 09:53

## 2017-08-14 RX ADMIN — Medication 8 UNIT(S): at 14:12

## 2017-08-14 RX ADMIN — ENOXAPARIN SODIUM 40 MILLIGRAM(S): 100 INJECTION SUBCUTANEOUS at 22:27

## 2017-08-14 RX ADMIN — OXYCODONE AND ACETAMINOPHEN 2 TABLET(S): 5; 325 TABLET ORAL at 04:23

## 2017-08-14 RX ADMIN — OXYCODONE AND ACETAMINOPHEN 2 TABLET(S): 5; 325 TABLET ORAL at 05:10

## 2017-08-14 RX ADMIN — FAMOTIDINE 20 MILLIGRAM(S): 10 INJECTION INTRAVENOUS at 05:53

## 2017-08-14 RX ADMIN — OXYCODONE AND ACETAMINOPHEN 1 TABLET(S): 5; 325 TABLET ORAL at 07:15

## 2017-08-14 RX ADMIN — OXYCODONE AND ACETAMINOPHEN 2 TABLET(S): 5; 325 TABLET ORAL at 19:47

## 2017-08-14 RX ADMIN — OXYCODONE AND ACETAMINOPHEN 1 TABLET(S): 5; 325 TABLET ORAL at 06:40

## 2017-08-14 RX ADMIN — OXYCODONE AND ACETAMINOPHEN 2 TABLET(S): 5; 325 TABLET ORAL at 20:20

## 2017-08-14 NOTE — PROGRESS NOTE ADULT - ASSESSMENT
57M s/p right mary grace hole for subacute SDH    Continue surgical drain, possible DC in AM  CT in AM  Neurochecks q1h

## 2017-08-14 NOTE — PROGRESS NOTE ADULT - ASSESSMENT
ASSESSMENT/PLAN:  atraumatic SDH s/p mary grace hole evacuation POD2; subdural ROSIE d/c'ed this am, post pull CTH stable    NEURO: Neurochecks q4h, keppra sz ppx to end 8/19, analgesia prn  Activity: [x] mobilize as tolerated [] Bedrest [x] PT [x] OT [] PMNR    PULM: O2sat<92, IS    CV: home antihypertensive regimen,   SBP goal 100-150    RENAL: IVL, D/C mullins    GI: famotidine  Diet: CCD diet  GI prophylaxis [] not indicated [] PPI [] other: pepcid Q12  Bowel regimen [x] colace [x] senna [] other:    ENDO: insulin sliding scale  Goal euglycemia (-180)    HEME/ONC:   VTE prophylaxis: [] SCDs [X]  chemoprophylaxis on lovenox [] high risk of DVT/PE on admission due to:    ID: afebrile    SOCIAL/FAMILY:  [] awaiting [] updated at bedside [] family meeting    CODE STATUS:  [X] Full Code [] DNR [] DNI [] Palliative/Comfort Care    DISPOSITION:  [] ICU [] Stroke Unit [x] Floor [] EMU [] RCU [] PCU ASSESSMENT/PLAN:  atraumatic SDH s/p mary grace hole evacuation POD2; subdural ROSIE d/c'ed this am, post pull CTH stable    NEURO: Neurochecks q4h, keppra sz ppx to end 8/19, analgesia prn  Activity: [x] mobilize as tolerated [] Bedrest [x] PT [x] OT [] PMNR    PULM: O2sat<92, IS    CV: home antihypertensive regimen,   SBP goal 100-150    RENAL: IVL, D/C mullins    GI: famotidine  Diet: CCD diet  GI prophylaxis [] not indicated [] PPI [] other: pepcid Q12  Bowel regimen [x] colace [x] senna [] other:    ENDO: insulin sliding scale  Goal euglycemia (-180)    HEME/ONC:   VTE prophylaxis: [] SCDs [X]  chemoprophylaxis on lovenox [] high risk of DVT/PE on admission due to:    ID: afebrile    SOCIAL/FAMILY:  [] awaiting [] updated at bedside [] family meeting    CODE STATUS:  [X] Full Code [] DNR [] DNI [] Palliative/Comfort Care    DISPOSITION:  [] ICU [] Stroke Unit [x] Floor [] EMU [] RCU [] PCU    35 minutes dedicated to this patient, who is not critically ill but still had a severe neurological injury or serious neurosurgical procedure, with high complexity management with multiple organ-system plan.

## 2017-08-14 NOTE — PROGRESS NOTE ADULT - SUBJECTIVE AND OBJECTIVE BOX
Patient seen and examined at bedside.    T(C): 36.6 (08-13-17 @ 23:00), Max: 36.8 (08-13-17 @ 03:00)  HR: 82 (08-14-17 @ 00:00) (73 - 101)  BP: 136/97 (08-14-17 @ 00:00) (118/81 - 141/101)  RR: 19 (08-13-17 @ 22:00) (15 - 27)  SpO2: 96% (08-14-17 @ 00:00) (89% - 100%)  Wt(kg): --    Exam:    AOx3, FC, PERRL, EOMI, no facial   5/5 throughout, no drift  SILT  no clonus

## 2017-08-14 NOTE — PROGRESS NOTE ADULT - SUBJECTIVE AND OBJECTIVE BOX
SUBJECTIVE:     Patient reports feeling well     OVERNIGHT EVENTS:     Vital Signs Last 24 Hrs  T(C): 36.4 (14 Aug 2017 11:00), Max: 36.6 (13 Aug 2017 15:00)  T(F): 97.5 (14 Aug 2017 11:00), Max: 97.8 (13 Aug 2017 15:00)  HR: 78 (14 Aug 2017 12:00) (70 - 99)  BP: 131/98 (14 Aug 2017 12:00) (113/97 - 160/110)  BP(mean): 109 (14 Aug 2017 12:00) (93 - 129)  RR: 18 (14 Aug 2017 12:00) (11 - 36)  SpO2: 96% (14 Aug 2017 12:00) (94% - 100%)    PHYSICAL EXAM:    General: No Acute Distress     Neurological: Awake, alert oriented to person, place and time, Following Commands, PERRL, EOMI, Face Symmetrical, Speech Fluent, Moving all extremities, Muscle Strength normal in all four extremities, VERY MILD RUE Drift, Sensation to Light Touch Intact    Patient requires some encouragement to give a good motor exam - my initial evaluation was effort limited, and he improved quickly after we went through the exam a second time with some verbal encouragement     Incision: clean, dry, intact with staples     LABS:                        15.5   15.5  )-----------( 297      ( 13 Aug 2017 22:41 )             44.5    08-13    139  |  101  |  23  ----------------------------<  192<H>  3.4<L>   |  26  |  0.96    Ca    8.5      13 Aug 2017 22:41  Phos  2.8     08-13  Mg     1.8     08-13      Hemoglobin A1C, Whole Blood: 7.5 % (08-12 @ 15:05)      08-13 @ 07:01  -  08-14 @ 07:00  --------------------------------------------------------  IN: 1540 mL / OUT: 1858 mL / NET: -318 mL    08-14 @ 07:01  -  08-14 @ 14:07  --------------------------------------------------------  IN: 240 mL / OUT: 620 mL / NET: -380 mL      DRAINS:     MEDICATIONS:  Antibiotics:    Neuro:  acetaminophen   Tablet 650 milliGRAM(s) Oral every 6 hours PRN For Temp greater than 38 C (100.4 F)  acetaminophen   Tablet. 650 milliGRAM(s) Oral every 6 hours PRN Mild Pain (1 - 3)  oxyCODONE    5 mG/acetaminophen 325 mG 1 Tablet(s) Oral every 4 hours PRN Moderate Pain  levETIRAcetam 500 milliGRAM(s) Oral every 12 hours  ondansetron Injectable 4 milliGRAM(s) IV Push every 6 hours PRN Nausea and/or Vomiting  oxyCODONE    5 mG/acetaminophen 325 mG 2 Tablet(s) Oral every 4 hours PRN Severe Pain    Cardiac:  losartan 25 milliGRAM(s) Oral daily  amLODIPine   Tablet 10 milliGRAM(s) Oral daily    Pulm:    GI/:  famotidine    Tablet 20 milliGRAM(s) Oral every 12 hours    Other:   enoxaparin Injectable 40 milliGRAM(s) SubCutaneous at bedtime  dextrose Gel 1 Dose(s) Oral once PRN Blood Glucose LESS THAN 70 milliGRAM(s)/deciliter  dextrose 50% Injectable 12.5 Gram(s) IV Push once  dextrose 50% Injectable 25 Gram(s) IV Push once  dextrose 50% Injectable 25 Gram(s) IV Push once  glucagon  Injectable 1 milliGRAM(s) IntraMuscular once PRN Glucose LESS THAN 70 milligrams/deciliter  insulin lispro (HumaLOG) corrective regimen sliding scale   SubCutaneous three times a day before meals  insulin lispro (HumaLOG) corrective regimen sliding scale   SubCutaneous at bedtime  insulin lispro Injectable (HumaLOG) 8 Unit(s) SubCutaneous three times a day before meals  insulin glargine Injectable (LANTUS) 19 Unit(s) SubCutaneous at bedtime    DIET: DASH/TLC     IMAGING: [facility] today is stable

## 2017-08-14 NOTE — OCCUPATIONAL THERAPY INITIAL EVALUATION ADULT - PLANNED THERAPY INTERVENTIONS, OT EVAL
balance training/fine motor coordination training/neuromuscular re-education/bed mobility training/ADL retraining/transfer training/strengthening

## 2017-08-14 NOTE — OCCUPATIONAL THERAPY INITIAL EVALUATION ADULT - ADDITIONAL COMMENTS
CT Head 8/12 Right convexity early subacute subdural hematoma, with 1.5 cm of leftward midline shift.

## 2017-08-14 NOTE — PHYSICAL THERAPY INITIAL EVALUATION ADULT - PERTINENT HX OF CURRENT PROBLEM, REHAB EVAL
Pt. 57 year old male admitted 8-12-17 as transfer from [facility] with SDH. CT large right sided SDH. Pt. to OR 8-12-17 for right mary grace hole

## 2017-08-14 NOTE — PROGRESS NOTE ADULT - SUBJECTIVE AND OBJECTIVE BOX
Chief Complaint: type 2 diabetes    Interval history: Pt has poor appetite curretnly as he is nauseated.  Sugars fell nearly 100 points o/n last night on increased glargine dose.    MEDICATIONS  (STANDING):  levETIRAcetam 500 milliGRAM(s) Oral every 12 hours  famotidine    Tablet 20 milliGRAM(s) Oral every 12 hours  enoxaparin Injectable 40 milliGRAM(s) SubCutaneous at bedtime  losartan 25 milliGRAM(s) Oral daily  dextrose 50% Injectable 12.5 Gram(s) IV Push once  dextrose 50% Injectable 25 Gram(s) IV Push once  dextrose 50% Injectable 25 Gram(s) IV Push once  insulin lispro (HumaLOG) corrective regimen sliding scale   SubCutaneous three times a day before meals  insulin lispro (HumaLOG) corrective regimen sliding scale   SubCutaneous at bedtime  amLODIPine   Tablet 10 milliGRAM(s) Oral daily  insulin lispro Injectable (HumaLOG) 8 Unit(s) SubCutaneous three times a day before meals  insulin glargine Injectable (LANTUS) 19 Unit(s) SubCutaneous at bedtime    MEDICATIONS  (PRN):  acetaminophen   Tablet 650 milliGRAM(s) Oral every 6 hours PRN For Temp greater than 38 C (100.4 F)  acetaminophen   Tablet. 650 milliGRAM(s) Oral every 6 hours PRN Mild Pain (1 - 3)  oxyCODONE    5 mG/acetaminophen 325 mG 1 Tablet(s) Oral every 4 hours PRN Moderate Pain  ondansetron Injectable 4 milliGRAM(s) IV Push every 6 hours PRN Nausea and/or Vomiting  oxyCODONE    5 mG/acetaminophen 325 mG 2 Tablet(s) Oral every 4 hours PRN Severe Pain  dextrose Gel 1 Dose(s) Oral once PRN Blood Glucose LESS THAN 70 milliGRAM(s)/deciliter  glucagon  Injectable 1 milliGRAM(s) IntraMuscular once PRN Glucose LESS THAN 70 milligrams/deciliter      Allergies    ampicillin (Short breath)  penicillin (Short breath)  Toprol-XL (Other)    Intolerances      Review of Systems:  GI: +nausea  Endocrine: no polyuria, polydipsia    ALL OTHER SYSTEMS REVIEWED AND NEGATIVE      PHYSICAL EXAM:  VITALS: T(C): 36.4 (08-14-17 @ 07:00)  T(F): 97.5 (08-14-17 @ 07:00), Max: 97.8 (08-13-17 @ 11:00)  HR: 87 (08-14-17 @ 07:00) (70 - 99)  BP: 151/118 (08-14-17 @ 07:00) (113/97 - 160/110)  RR:  (15 - 36)  SpO2:  (94% - 100%)  Wt(kg): --  GENERAL: NAD, well-groomed, well-developed  EYES: No proptosis, no lid lag, anicteric  SKIN: Dry, intact, No rashes or lesions  PSYCH: Alert and oriented x 3, normal affect, normal mood    CAPILLARY BLOOD GLUCOSE  116 (08-14 @ 08:15)  192 (08-13 @ 23:00)  138 (08-13 @ 18:00)  198 (08-13 @ 13:00)  159 (08-13 @ 08:00)  332 (08-12 @ 17:07)  228 (08-12 @ 11:00)  141 (08-11 @ 23:27)      08-13    139  |  101  |  23  ----------------------------<  192<H>  3.4<L>   |  26  |  0.96    EGFR if : 101  EGFR if non : 87    Ca    8.5      08-13  Mg     1.8     08-13  Phos  2.8     08-13    TPro  7.3  /  Alb  3.8  /  TBili  1.2  /  DBili  x   /  AST  38  /  ALT  47<H>  /  AlkPhos  98  08-12          Thyroid Function Tests:      Hemoglobin A1C, Whole Blood: 7.5 % <H> [4.0 - 5.6] (08-12-17 @ 15:05)

## 2017-08-14 NOTE — OCCUPATIONAL THERAPY INITIAL EVALUATION ADULT - DIAGNOSIS, OT EVAL
Decreased balance, strength, endurance, coordination impacting ability to perform ADLs and functional mobility

## 2017-08-14 NOTE — PROGRESS NOTE ADULT - SUBJECTIVE AND OBJECTIVE BOX
57M with HTN, HLD, DM presents with 3 days of difficulty walking. He went to [facility] where CT head showed large right sided subacute SDH with 1.5cm MLS. He denies history of head trauma. He denies antiplatelet/anticoagulation use. He has severe difficulty walking even with assistance. He was complaining of headache at [facility] which was treated with benadryl, decadron, and reglan however he does not currently complain of headache. He is awake and appropriately responsive although at times seems to fall asleep which he attributes to the benadryl. Notably, he states that he had left sided hematoma that was treated non-operatively at [facility]. Transferred underwent R mary grace hole placement, post op CT demonstrated significant hydrocephalus.     Pt concerned for brain damage and eligibility for social security. C/o HA/nausea this morning, no vomiting episodes, tolerating PO.     subdural ROSIE d/c'ed this am, post-pull CTH stable.     VITALS:   T(C): 36.4 (08-14-17 @ 11:00), Max: 36.6 (08-13-17 @ 15:00)  HR: 83 (08-14-17 @ 11:00) (70 - 99)  BP: 127/92 (08-14-17 @ 11:00) (113/97 - 160/110)  RR: 11 (08-14-17 @ 11:00) (11 - 36)  SpO2: 96% (08-14-17 @ 11:00) (94% - 100%)    08-13-17 @ 07:01  -  08-14-17 @ 07:00  --------------------------------------------------------  IN: 1540 mL / OUT: 1858 mL / NET: -318 mL    08-14-17 @ 07:01  -  08-14-17 @ 11:52  --------------------------------------------------------  IN: 240 mL / OUT: 620 mL / NET: -380 mL                        15.5   15.5  )-----------( 297      ( 13 Aug 2017 22:41 )             44.5     139  |  101  |  23  ----------------------------<  192<H>  3.4<L>   |  26  |  0.96    Ca    8.5      13 Aug 2017 22:41  Phos  2.8     08-13  Mg     1.8     08-13    MEDICATIONS  (STANDING):  levETIRAcetam 500 milliGRAM(s) Oral every 12 hours  famotidine    Tablet 20 milliGRAM(s) Oral every 12 hours  enoxaparin Injectable 40 milliGRAM(s) SubCutaneous at bedtime  losartan 25 milliGRAM(s) Oral daily  dextrose 50% Injectable 12.5 Gram(s) IV Push once  dextrose 50% Injectable 25 Gram(s) IV Push once  dextrose 50% Injectable 25 Gram(s) IV Push once  insulin lispro (HumaLOG) corrective regimen sliding scale   SubCutaneous three times a day before meals  insulin lispro (HumaLOG) corrective regimen sliding scale   SubCutaneous at bedtime  amLODIPine   Tablet 10 milliGRAM(s) Oral daily  insulin lispro Injectable (HumaLOG) 8 Unit(s) SubCutaneous three times a day before meals  insulin glargine Injectable (LANTUS) 19 Unit(s) SubCutaneous at bedtime      General: well apprearing, no acute distress  Neurological: alert, oriented x3, language intact, perrl, eomi, face=, tup midline, 5/5 throughout, sensation intact to light touch, no dysmetria.   CVS: rrr  Pulm: ctabl  Abd: soft/nt/nd  Ext: warm, well perfused  Skin: no rashes 57M with HTN, HLD, DM presents with 3 days of difficulty walking. He went to [facility] where CT head showed large right sided subacute SDH with 1.5cm MLS. He denies history of head trauma. He denies antiplatelet/anticoagulation use. He has severe difficulty walking even with assistance. He was complaining of headache at [facility] which was treated with benadryl, decadron, and reglan however he does not currently complain of headache. He is awake and appropriately responsive although at times seems to fall asleep which he attributes to the benadryl. Notably, he states that he had left sided hematoma that was treated non-operatively at [facility]. Transferred underwent R mary grace hole placement, post op CT demonstrated significant hydrocephalus.     Pt concerned for brain damage and eligibility for social security. C/o HA/nausea this morning, no vomiting episodes, tolerating PO.     subdural ROSIE d/c'ed this am, post-pull CTH stable.     no new pain or weakness    VITALS:   T(C): 36.4 (08-14-17 @ 11:00), Max: 36.6 (08-13-17 @ 15:00)  HR: 83 (08-14-17 @ 11:00) (70 - 99)  BP: 127/92 (08-14-17 @ 11:00) (113/97 - 160/110)  RR: 11 (08-14-17 @ 11:00) (11 - 36)  SpO2: 96% (08-14-17 @ 11:00) (94% - 100%)    08-13-17 @ 07:01  -  08-14-17 @ 07:00  --------------------------------------------------------  IN: 1540 mL / OUT: 1858 mL / NET: -318 mL    08-14-17 @ 07:01  -  08-14-17 @ 11:52  --------------------------------------------------------  IN: 240 mL / OUT: 620 mL / NET: -380 mL                        15.5   15.5  )-----------( 297      ( 13 Aug 2017 22:41 )             44.5     139  |  101  |  23  ----------------------------<  192<H>  3.4<L>   |  26  |  0.96    Ca    8.5      13 Aug 2017 22:41  Phos  2.8     08-13  Mg     1.8     08-13    MEDICATIONS  (STANDING):  levETIRAcetam 500 milliGRAM(s) Oral every 12 hours  famotidine    Tablet 20 milliGRAM(s) Oral every 12 hours  enoxaparin Injectable 40 milliGRAM(s) SubCutaneous at bedtime  losartan 25 milliGRAM(s) Oral daily  dextrose 50% Injectable 12.5 Gram(s) IV Push once  dextrose 50% Injectable 25 Gram(s) IV Push once  dextrose 50% Injectable 25 Gram(s) IV Push once  insulin lispro (HumaLOG) corrective regimen sliding scale   SubCutaneous three times a day before meals  insulin lispro (HumaLOG) corrective regimen sliding scale   SubCutaneous at bedtime  amLODIPine   Tablet 10 milliGRAM(s) Oral daily  insulin lispro Injectable (HumaLOG) 8 Unit(s) SubCutaneous three times a day before meals  insulin glargine Injectable (LANTUS) 19 Unit(s) SubCutaneous at bedtime      General: well apprearing, no acute distress  Neurological: alert, oriented x3, language intact, perrl, eomi, face=, tup midline, 5/5 throughout, sensation intact to light touch, no dysmetria.   CVS: rrr  Pulm: ctabl  Abd: soft/nt/nd  Ext: warm, well perfused  Skin: no rashes

## 2017-08-14 NOTE — PROGRESS NOTE ADULT - PROBLEM SELECTOR PLAN 1
pt with improved finger sticks close to target although fell nearly 100 points o/n so will reduce glargine back from 23 to 20 units and will continue humalog 8 units before meals and cont moderate sliding scale  on discharge need to clarify if has endocrinologist and he was on lantus and oral at home pt with improved finger sticks close to target although fell nearly 100 points o/n so will reduce glargine back from 23 to 19 (primary team already did this) units and will continue humalog 8 units before meals and cont moderate sliding scale  on discharge need to clarify if has endocrinologist and he was on lantus and oral at home

## 2017-08-14 NOTE — OCCUPATIONAL THERAPY INITIAL EVALUATION ADULT - PERTINENT HX OF CURRENT PROBLEM, REHAB EVAL
57M  p/w 3 days of difficulty walking. He went to [facility] where CT head showed large right sided subacute SDH with 1.5cm MLS. He has severe difficulty walking even with assistance. He was c/o headache at [facility] which was treated with benadryl, decadron, and reglan He is awake and appropriately responsive although at times seems to fall asleep. Notably, he states that he had left sided hematoma that was treated non-operatively at [facility].

## 2017-08-14 NOTE — PROGRESS NOTE ADULT - ASSESSMENT
57-year old man POD#2 from right mary grace hole for large cSDH, doing well  -OT evaluation  -Insulin for DM   -Encourage OOB

## 2017-08-15 DIAGNOSIS — I62.00 NONTRAUMATIC SUBDURAL HEMORRHAGE, UNSPECIFIED: ICD-10-CM

## 2017-08-15 DIAGNOSIS — E78.00 PURE HYPERCHOLESTEROLEMIA, UNSPECIFIED: ICD-10-CM

## 2017-08-15 DIAGNOSIS — I10 ESSENTIAL (PRIMARY) HYPERTENSION: ICD-10-CM

## 2017-08-15 PROCEDURE — 99222 1ST HOSP IP/OBS MODERATE 55: CPT

## 2017-08-15 PROCEDURE — 99232 SBSQ HOSP IP/OBS MODERATE 35: CPT

## 2017-08-15 PROCEDURE — 99223 1ST HOSP IP/OBS HIGH 75: CPT

## 2017-08-15 RX ORDER — ACETAMINOPHEN 500 MG
1000 TABLET ORAL ONCE
Qty: 0 | Refills: 0 | Status: DISCONTINUED | OUTPATIENT
Start: 2017-08-15 | End: 2017-08-16

## 2017-08-15 RX ORDER — LOSARTAN POTASSIUM 100 MG/1
50 TABLET, FILM COATED ORAL DAILY
Qty: 0 | Refills: 0 | Status: DISCONTINUED | OUTPATIENT
Start: 2017-08-15 | End: 2017-08-16

## 2017-08-15 RX ADMIN — LOSARTAN POTASSIUM 25 MILLIGRAM(S): 100 TABLET, FILM COATED ORAL at 05:13

## 2017-08-15 RX ADMIN — OXYCODONE AND ACETAMINOPHEN 2 TABLET(S): 5; 325 TABLET ORAL at 00:30

## 2017-08-15 RX ADMIN — OXYCODONE AND ACETAMINOPHEN 2 TABLET(S): 5; 325 TABLET ORAL at 17:22

## 2017-08-15 RX ADMIN — OXYCODONE AND ACETAMINOPHEN 2 TABLET(S): 5; 325 TABLET ORAL at 00:03

## 2017-08-15 RX ADMIN — FAMOTIDINE 20 MILLIGRAM(S): 10 INJECTION INTRAVENOUS at 17:21

## 2017-08-15 RX ADMIN — INSULIN GLARGINE 19 UNIT(S): 100 INJECTION, SOLUTION SUBCUTANEOUS at 00:03

## 2017-08-15 RX ADMIN — Medication 8 UNIT(S): at 09:30

## 2017-08-15 RX ADMIN — ENOXAPARIN SODIUM 40 MILLIGRAM(S): 100 INJECTION SUBCUTANEOUS at 21:28

## 2017-08-15 RX ADMIN — LEVETIRACETAM 500 MILLIGRAM(S): 250 TABLET, FILM COATED ORAL at 05:13

## 2017-08-15 RX ADMIN — AMLODIPINE BESYLATE 10 MILLIGRAM(S): 2.5 TABLET ORAL at 21:28

## 2017-08-15 RX ADMIN — FAMOTIDINE 20 MILLIGRAM(S): 10 INJECTION INTRAVENOUS at 05:13

## 2017-08-15 RX ADMIN — OXYCODONE AND ACETAMINOPHEN 2 TABLET(S): 5; 325 TABLET ORAL at 21:26

## 2017-08-15 RX ADMIN — LEVETIRACETAM 500 MILLIGRAM(S): 250 TABLET, FILM COATED ORAL at 17:21

## 2017-08-15 RX ADMIN — OXYCODONE AND ACETAMINOPHEN 2 TABLET(S): 5; 325 TABLET ORAL at 04:51

## 2017-08-15 RX ADMIN — ONDANSETRON 4 MILLIGRAM(S): 8 TABLET, FILM COATED ORAL at 09:42

## 2017-08-15 RX ADMIN — Medication 4: at 12:46

## 2017-08-15 RX ADMIN — INSULIN GLARGINE 19 UNIT(S): 100 INJECTION, SOLUTION SUBCUTANEOUS at 21:26

## 2017-08-15 RX ADMIN — OXYCODONE AND ACETAMINOPHEN 2 TABLET(S): 5; 325 TABLET ORAL at 22:00

## 2017-08-15 RX ADMIN — Medication 8 UNIT(S): at 17:22

## 2017-08-15 RX ADMIN — Medication 8 UNIT(S): at 12:46

## 2017-08-15 NOTE — PROGRESS NOTE ADULT - SUBJECTIVE AND OBJECTIVE BOX
Subjective: Pt c/o of vomiting and headaches. Nurse reports pt had a BP of 170/113 while vomiting. Tolerating Po. Denies blurry vision, photophobia, dizziness.     Vital Signs Last 24 Hrs  T(C): 36.8 (15 Aug 2017 08:14), Max: 37.1 (15 Aug 2017 05:08)  T(F): 98.2 (15 Aug 2017 08:14), Max: 98.7 (15 Aug 2017 05:08)  HR: 48 (15 Aug 2017 08:14) (48 - 91)  BP: 170/113 (15 Aug 2017 09:43) (126/97 - 170/113)  BP(mean): 122 (14 Aug 2017 14:00) (108 - 122)  RR: 18 (15 Aug 2017 08:14) (15 - 18)  SpO2: 94% (15 Aug 2017 09:43) (94% - 100%)    Physical Exam:  General: No acute distress, sitting in chair  Cardiac: [id number]  Lungs: Clear  Abdomen: Soft, non-tender, +BS  Extremities: No c/c/e  Neurologic: Awake, alert, fully oriented, follows commands, PERRL, EOMI, face symmetric, tongue midline, no drift, full strength, BOLDEN 5/5  Incision: +staples, c/d/i    MEDICATIONS  (STANDING):  levETIRAcetam 500 milliGRAM(s) Oral every 12 hours  famotidine    Tablet 20 milliGRAM(s) Oral every 12 hours  enoxaparin Injectable 40 milliGRAM(s) SubCutaneous at bedtime  losartan 25 milliGRAM(s) Oral daily  dextrose 50% Injectable 12.5 Gram(s) IV Push once  dextrose 50% Injectable 25 Gram(s) IV Push once  dextrose 50% Injectable 25 Gram(s) IV Push once  insulin lispro (HumaLOG) corrective regimen sliding scale   SubCutaneous three times a day before meals  insulin lispro (HumaLOG) corrective regimen sliding scale   SubCutaneous at bedtime  amLODIPine   Tablet 10 milliGRAM(s) Oral daily  insulin lispro Injectable (HumaLOG) 8 Unit(s) SubCutaneous three times a day before meals  insulin glargine Injectable (LANTUS) 19 Unit(s) SubCutaneous at bedtime  acetaminophen  IVPB. 1000 milliGRAM(s) IV Intermittent once    MEDICATIONS  (PRN):  acetaminophen   Tablet 650 milliGRAM(s) Oral every 6 hours PRN For Temp greater than 38 C (100.4 F)  acetaminophen   Tablet. 650 milliGRAM(s) Oral every 6 hours PRN Mild Pain (1 - 3)  oxyCODONE    5 mG/acetaminophen 325 mG 1 Tablet(s) Oral every 4 hours PRN Moderate Pain  ondansetron Injectable 4 milliGRAM(s) IV Push every 6 hours PRN Nausea and/or Vomiting  oxyCODONE    5 mG/acetaminophen 325 mG 2 Tablet(s) Oral every 4 hours PRN Severe Pain  dextrose Gel 1 Dose(s) Oral once PRN Blood Glucose LESS THAN 70 milliGRAM(s)/deciliter  glucagon  Injectable 1 milliGRAM(s) IntraMuscular once PRN Glucose LESS THAN 70 milligrams/deciliter    IMAGING:    CT Head No Cont (08.14.17 @ 09:36)  Decreased intracranial air. Otherwise stable examination. Subjective: Pt seen and examined at bedside. Complained of vomiting and headaches earlier this am which have improved. Nurse reports pt had a BP of 170/113 while vomiting. Tolerating Po. Denies blurry vision, photophobia, dizziness.     Vital Signs Last 24 Hrs  T(C): 36.8 (15 Aug 2017 08:14), Max: 37.1 (15 Aug 2017 05:08)  T(F): 98.2 (15 Aug 2017 08:14), Max: 98.7 (15 Aug 2017 05:08)  HR: 48 (15 Aug 2017 08:14) (48 - 91)  BP: 170/113 (15 Aug 2017 09:43) (126/97 - 170/113)  BP(mean): 122 (14 Aug 2017 14:00) (108 - 122)  RR: 18 (15 Aug 2017 08:14) (15 - 18)  SpO2: 94% (15 Aug 2017 09:43) (94% - 100%)    Physical Exam:    Neurologic: Awake, alert, oriented to self, place, and hospital, speech clear, follows commands, moves all extremities with 5/5 strength, sensation intact to light touch throughout, pupils 3mm and reactive bilaterally, EOM intact, face symmetric, tongue midline    Incision: +staples, c/d/i    General: No acute distress, sitting in chair  Cardiac: [id number]  Lungs: Clear  Abdomen: Soft, non-tender, +BS  Extremities: No c/c/e    MEDICATIONS  (STANDING):  levETIRAcetam 500 milliGRAM(s) Oral every 12 hours  famotidine    Tablet 20 milliGRAM(s) Oral every 12 hours  enoxaparin Injectable 40 milliGRAM(s) SubCutaneous at bedtime  losartan 25 milliGRAM(s) Oral daily  dextrose 50% Injectable 12.5 Gram(s) IV Push once  dextrose 50% Injectable 25 Gram(s) IV Push once  dextrose 50% Injectable 25 Gram(s) IV Push once  insulin lispro (HumaLOG) corrective regimen sliding scale   SubCutaneous three times a day before meals  insulin lispro (HumaLOG) corrective regimen sliding scale   SubCutaneous at bedtime  amLODIPine   Tablet 10 milliGRAM(s) Oral daily  insulin lispro Injectable (HumaLOG) 8 Unit(s) SubCutaneous three times a day before meals  insulin glargine Injectable (LANTUS) 19 Unit(s) SubCutaneous at bedtime  acetaminophen  IVPB. 1000 milliGRAM(s) IV Intermittent once    MEDICATIONS  (PRN):  acetaminophen   Tablet 650 milliGRAM(s) Oral every 6 hours PRN For Temp greater than 38 C (100.4 F)  acetaminophen   Tablet. 650 milliGRAM(s) Oral every 6 hours PRN Mild Pain (1 - 3)  oxyCODONE    5 mG/acetaminophen 325 mG 1 Tablet(s) Oral every 4 hours PRN Moderate Pain  ondansetron Injectable 4 milliGRAM(s) IV Push every 6 hours PRN Nausea and/or Vomiting  oxyCODONE    5 mG/acetaminophen 325 mG 2 Tablet(s) Oral every 4 hours PRN Severe Pain  dextrose Gel 1 Dose(s) Oral once PRN Blood Glucose LESS THAN 70 milliGRAM(s)/deciliter  glucagon  Injectable 1 milliGRAM(s) IntraMuscular once PRN Glucose LESS THAN 70 milligrams/deciliter    IMAGING:    CT Head No Cont (08.14.17 @ 09:36)  Decreased intracranial air. Otherwise stable examination.

## 2017-08-15 NOTE — CONSULT NOTE ADULT - SUBJECTIVE AND OBJECTIVE BOX
Patient is a 57y old  Male who presents with a chief complaint of SDH, transfer from [place] (12 Aug 2017 05:19)      SUBJECTIVE / OVERNIGHT EVENTS: No acute events overnight, pt has periods of confusion during exam, no chest pain, sob, abdominal pain     MEDICATIONS  (STANDING):  levETIRAcetam 500 milliGRAM(s) Oral every 12 hours  famotidine    Tablet 20 milliGRAM(s) Oral every 12 hours  enoxaparin Injectable 40 milliGRAM(s) SubCutaneous at bedtime  losartan 25 milliGRAM(s) Oral daily  dextrose 50% Injectable 12.5 Gram(s) IV Push once  dextrose 50% Injectable 25 Gram(s) IV Push once  dextrose 50% Injectable 25 Gram(s) IV Push once  insulin lispro (HumaLOG) corrective regimen sliding scale   SubCutaneous three times a day before meals  insulin lispro (HumaLOG) corrective regimen sliding scale   SubCutaneous at bedtime  amLODIPine   Tablet 10 milliGRAM(s) Oral daily  insulin lispro Injectable (HumaLOG) 8 Unit(s) SubCutaneous three times a day before meals  insulin glargine Injectable (LANTUS) 19 Unit(s) SubCutaneous at bedtime  acetaminophen  IVPB. 1000 milliGRAM(s) IV Intermittent once    MEDICATIONS  (PRN):  acetaminophen   Tablet 650 milliGRAM(s) Oral every 6 hours PRN For Temp greater than 38 C (100.4 F)  acetaminophen   Tablet. 650 milliGRAM(s) Oral every 6 hours PRN Mild Pain (1 - 3)  oxyCODONE    5 mG/acetaminophen 325 mG 1 Tablet(s) Oral every 4 hours PRN Moderate Pain  ondansetron Injectable 4 milliGRAM(s) IV Push every 6 hours PRN Nausea and/or Vomiting  oxyCODONE    5 mG/acetaminophen 325 mG 2 Tablet(s) Oral every 4 hours PRN Severe Pain  dextrose Gel 1 Dose(s) Oral once PRN Blood Glucose LESS THAN 70 milliGRAM(s)/deciliter  glucagon  Injectable 1 milliGRAM(s) IntraMuscular once PRN Glucose LESS THAN 70 milligrams/deciliter        CAPILLARY BLOOD GLUCOSE  206 (15 Aug 2017 12:48)  133 (15 Aug 2017 08:35)  128 (14 Aug 2017 22:32)  118 (14 Aug 2017 18:21)        I&O's Summary    14 Aug 2017 07:01  -  15 Aug 2017 07:00  --------------------------------------------------------  IN: 1010 mL / OUT: 1120 mL / NET: -110 mL    15 Aug 2017 07:01  -  15 Aug 2017 16:11  --------------------------------------------------------  IN: 720 mL / OUT: 0 mL / NET: 720 mL        PHYSICAL EXAM:  GENERAL: NAD, well-developed  HEAD:  Atraumatic, Normocephalic  EYES: EOMI, PERRLA, conjunctiva and sclera clear  NECK: Supple, No JVD  CHEST/LUNG: Clear to auscultation bilaterally; No wheeze  HEART: Regular rate and rhythm; S1S2  ABDOMEN: Soft, Nontender, Nondistended; Bowel sounds present  EXTREMITIES:  2+ Peripheral Pulses, No clubbing, cyanosis, or edema  PSYCH: AAOx2, confused     LABS:                        15.5   15.5  )-----------( 297      ( 13 Aug 2017 22:41 )             44.5     08-13    139  |  101  |  23  ----------------------------<  192<H>  3.4<L>   |  26  |  0.96    Ca    8.5      13 Aug 2017 22:41  Phos  2.8     08-13  Mg     1.8     08-13                RADIOLOGY & ADDITIONAL TESTS:    Imaging Personally Reviewed:    Consultant(s) Notes Reviewed:  Neurosx    Care Discussed with Consultants/Other Providers: Neurosx

## 2017-08-15 NOTE — CONSULT NOTE ADULT - PROBLEM SELECTOR RECOMMENDATION 3
-FS somewhat labile  -Endocrine follow up   -Currently on lantus 19u and humalog 8u tiid, can increase humalog to 10u tid for optimal coverage

## 2017-08-15 NOTE — CONSULT NOTE ADULT - SUBJECTIVE AND OBJECTIVE BOX
Patient is a 57y old  Male who presents with a chief complaint of SDH, transfer from [place] (12 Aug 2017 05:19)      HPI:  57M with HTN, HLD, DM presents with 3 days of difficulty walking. He went to [facility] where CT head showed large right sided subacute SDH with 1.5cm MLS. He denies history of head trauma. Patient with large SDH. Went to OR for right burrhole. Post-operative course uncomplicated.     REVIEW OF SYSTEMS: No chest pain, shortness of breath, nausea, vomiting or diarhea.      PAST MEDICAL & SURGICAL HISTORY  High cholesterol  Hypertension  Diabetes mellitus  Abrasion of right middle finger  Acute medial meniscus tear of left knee  History of appendectomy      SOCIAL HISTORY  Smoking - Denied, EtOH - Denied, Drugs - Denied    FUNCTIONAL HISTORY:   Lives with mother- home w 5 DANIE  Independent with ambulation and ADLs PTA    RECENT LABS/IMAGING  CBC Full  -  ( 13 Aug 2017 22:41 )  WBC Count : 15.5 K/uL  Hemoglobin : 15.5 g/dL  Hematocrit : 44.5 %  Platelet Count - Automated : 297 K/uL  Mean Cell Volume : 87.1 fl  Mean Cell Hemoglobin : 30.3 pg  Mean Cell Hemoglobin Concentration : 34.8 gm/dL  Auto Neutrophil # : x  Auto Lymphocyte # : x  Auto Monocyte # : x  Auto Eosinophil # : x  Auto Basophil # : x  Auto Neutrophil % : x  Auto Lymphocyte % : x  Auto Monocyte % : x  Auto Eosinophil % : x  Auto Basophil % : x    08-13    139  |  101  |  23  ----------------------------<  192<H>  3.4<L>   |  26  |  0.96    Ca    8.5      13 Aug 2017 22:41  Phos  2.8     08-13  Mg     1.8     08-13          VITALS  T(C): 36.8 (08-15-17 @ 08:14), Max: 37.1 (08-15-17 @ 05:08)  HR: 48 (08-15-17 @ 08:14) (48 - 91)  BP: 170/113 (08-15-17 @ 09:43) (126/97 - 170/113)  RR: 18 (08-15-17 @ 08:14) (11 - 18)  SpO2: 94% (08-15-17 @ 09:43) (94% - 100%)  Wt(kg): --    ALLERGIES  ampicillin (Short breath)  penicillin (Short breath)  Toprol-XL (Other)      MEDICATIONS   acetaminophen   Tablet 650 milliGRAM(s) Oral every 6 hours PRN  acetaminophen   Tablet. 650 milliGRAM(s) Oral every 6 hours PRN  oxyCODONE    5 mG/acetaminophen 325 mG 1 Tablet(s) Oral every 4 hours PRN  levETIRAcetam 500 milliGRAM(s) Oral every 12 hours  ondansetron Injectable 4 milliGRAM(s) IV Push every 6 hours PRN  famotidine    Tablet 20 milliGRAM(s) Oral every 12 hours  enoxaparin Injectable 40 milliGRAM(s) SubCutaneous at bedtime  losartan 25 milliGRAM(s) Oral daily  oxyCODONE    5 mG/acetaminophen 325 mG 2 Tablet(s) Oral every 4 hours PRN  dextrose Gel 1 Dose(s) Oral once PRN  dextrose 50% Injectable 12.5 Gram(s) IV Push once  dextrose 50% Injectable 25 Gram(s) IV Push once  dextrose 50% Injectable 25 Gram(s) IV Push once  glucagon  Injectable 1 milliGRAM(s) IntraMuscular once PRN  insulin lispro (HumaLOG) corrective regimen sliding scale   SubCutaneous three times a day before meals  insulin lispro (HumaLOG) corrective regimen sliding scale   SubCutaneous at bedtime  amLODIPine   Tablet 10 milliGRAM(s) Oral daily  insulin lispro Injectable (HumaLOG) 8 Unit(s) SubCutaneous three times a day before meals  insulin glargine Injectable (LANTUS) 19 Unit(s) SubCutaneous at bedtime  acetaminophen  IVPB. 1000 milliGRAM(s) IV Intermittent once      ----------------------------------------------------------------------------------------  PHYSICAL EXAM  Constitutional - NAD, Comfortable  HEENT - NCAT, EOMI  Neck - Supple, No limited ROM  Chest - CTA bilaterally, No wheeze, No rhonchi, No crackles  Cardiovascular - RRR, S1S2, No murmurs  Abdomen - BS+, Soft, NTND  Extremities - No C/C/E, No calf tenderness   Neurologic Exam -                    Cognitive - Awake, Alert, AAO to self, place, date, year, situation     Communication - Fluent, No dysarthria, no aphasia     Cranial Nerves - CN 2-12 intact     Motor - No focal deficits                       Sensory - Intact to LT     Reflexes - DTR Intact, No primitive reflexive     Balance - WNL Static  Psychiatric - Mood stable, Affect WNL      Impression:  56 yo with SDH, S/P burrhole with gait dysfunction      Plan:  PT- ROM, Bed Mob, Transfers, Amb w AD and bracing as needed  OT- ADLs, bracing  SLP- Dysphagia eval and treat  Prec- Falls, Cardiac  DVT Prophylaxis  Skin- Turn q2 h  Dispo- Patient is a 57y old  Male who presents with a chief complaint of SDH, transfer from [place] (12 Aug 2017 05:19)      HPI:  57M with HTN, HLD, DM presents with 3 days of difficulty walking. He went to [facility] where CT head showed large right sided subacute SDH with 1.5cm MLS. He denies history of head trauma. Patient with large SDH. Went to OR for right burrhole. Post-operative course uncomplicated.     REVIEW OF SYSTEMS: HA- controlled with medications, poor balance,No chest pain, shortness of breath, nausea, vomiting or diarhea.      PAST MEDICAL & SURGICAL HISTORY  High cholesterol  Hypertension  Diabetes mellitus  Abrasion of right middle finger  Acute medial meniscus tear of left knee  History of appendectomy      SOCIAL HISTORY  Smoking - Denied, EtOH - Denied, Drugs - Denied    FUNCTIONAL HISTORY:   Lives with mother- home w 5 DANIE  Independent with ambulation and ADLs PTA    RECENT LABS/IMAGING  CBC Full  -  ( 13 Aug 2017 22:41 )  WBC Count : 15.5 K/uL  Hemoglobin : 15.5 g/dL  Hematocrit : 44.5 %  Platelet Count - Automated : 297 K/uL  Mean Cell Volume : 87.1 fl  Mean Cell Hemoglobin : 30.3 pg  Mean Cell Hemoglobin Concentration : 34.8 gm/dL  Auto Neutrophil # : x  Auto Lymphocyte # : x  Auto Monocyte # : x  Auto Eosinophil # : x  Auto Basophil # : x  Auto Neutrophil % : x  Auto Lymphocyte % : x  Auto Monocyte % : x  Auto Eosinophil % : x  Auto Basophil % : x    08-13    139  |  101  |  23  ----------------------------<  192<H>  3.4<L>   |  26  |  0.96    Ca    8.5      13 Aug 2017 22:41  Phos  2.8     08-13  Mg     1.8     08-13          VITALS  T(C): 36.8 (08-15-17 @ 08:14), Max: 37.1 (08-15-17 @ 05:08)  HR: 48 (08-15-17 @ 08:14) (48 - 91)  BP: 170/113 (08-15-17 @ 09:43) (126/97 - 170/113)  RR: 18 (08-15-17 @ 08:14) (11 - 18)  SpO2: 94% (08-15-17 @ 09:43) (94% - 100%)  Wt(kg): --    ALLERGIES  ampicillin (Short breath)  penicillin (Short breath)  Toprol-XL (Other)      MEDICATIONS   acetaminophen   Tablet 650 milliGRAM(s) Oral every 6 hours PRN  acetaminophen   Tablet. 650 milliGRAM(s) Oral every 6 hours PRN  oxyCODONE    5 mG/acetaminophen 325 mG 1 Tablet(s) Oral every 4 hours PRN  levETIRAcetam 500 milliGRAM(s) Oral every 12 hours  ondansetron Injectable 4 milliGRAM(s) IV Push every 6 hours PRN  famotidine    Tablet 20 milliGRAM(s) Oral every 12 hours  enoxaparin Injectable 40 milliGRAM(s) SubCutaneous at bedtime  losartan 25 milliGRAM(s) Oral daily  oxyCODONE    5 mG/acetaminophen 325 mG 2 Tablet(s) Oral every 4 hours PRN  dextrose Gel 1 Dose(s) Oral once PRN  dextrose 50% Injectable 12.5 Gram(s) IV Push once  dextrose 50% Injectable 25 Gram(s) IV Push once  dextrose 50% Injectable 25 Gram(s) IV Push once  glucagon  Injectable 1 milliGRAM(s) IntraMuscular once PRN  insulin lispro (HumaLOG) corrective regimen sliding scale   SubCutaneous three times a day before meals  insulin lispro (HumaLOG) corrective regimen sliding scale   SubCutaneous at bedtime  amLODIPine   Tablet 10 milliGRAM(s) Oral daily  insulin lispro Injectable (HumaLOG) 8 Unit(s) SubCutaneous three times a day before meals  insulin glargine Injectable (LANTUS) 19 Unit(s) SubCutaneous at bedtime  acetaminophen  IVPB. 1000 milliGRAM(s) IV Intermittent once      ----------------------------------------------------------------------------------------  PHYSICAL EXAM  Constitutional - NAD, Comfortable  HEENT - Incision C/D/I  Extremities - No C/C/E, No calf tenderness   Neurologic Exam -                    Cognitive - Awake, Alert, AAO x 2     Slow processing, poor insight     Motor - No focal deficits           Psychiatric - Affect WNL      Impression:  56 yo with SDH, S/P burrhole with gait dysfunction      Plan:  PT- ROM, Bed Mob, Transfers, Amb w AD   OT- ADLs, bracing  SLP- Dysphagia eval and treat  Prec- Falls, Cardiac  DVT Prophylaxis- lovenox  Skin- Turn q2 h  Dispo- Acute Rehab- can tolerate 3h/d PT/OT/SLP and requires daily physician visits

## 2017-08-15 NOTE — PROGRESS NOTE ADULT - PROBLEM SELECTOR PLAN 1
Continue glargine 19 units and will continue humalog 8 units before meals and cont moderate sliding scale  on discharge need to clarify if has endocrinologist and he was on lantus and oral at home

## 2017-08-15 NOTE — CONSULT NOTE ADULT - ASSESSMENT
57 year old male with hx of HTN, DM2, HLD, SDH in 2016 s/p fall, p/w 3 days of walking difficulty. Found to have large right sided subacute SDH with 1.5cm MLS. Admitted on 8/12 [facility] for subacute SDH evacuation, POD #3.         Plan:  -Continue keppra 500q12 (till 8/19) for seizure ppx  -Continue Percocet PRN for headaches  -Endo follow up appreciated. Continue Lantus 19units qHS, Humalog 8units TID AC, moderate HISS for DM. Last   -Continue Losartan/norvasc for HTN, elevated BP today likely due to pain. Monitor BP after pain control  -Zofran PRN for nausea/vomiting  -Encouraged mobilization  -Incentive spirometer  -DVT ppx: SQL, venodynes  -Dispo: PT/OT/PMR-Acute rehab  -Likely D/C to [facility] rehab tomorrow  -discussed with Dr. Guadarrama 57 year old male with hx of HTN, DM2, HLD, SDH in 2016 s/p fall, p/w 3 days of walking difficulty. Found to have large right sided subacute SDH with 1.5cm MLS. Admitted on 8/12 [facility] for subacute SDH evacuation, POD #3.

## 2017-08-15 NOTE — CONSULT NOTE ADULT - PROBLEM SELECTOR RECOMMENDATION 9
-Pt with right sided subacute SDH with 1.5cm MLS [facility]/p [facility] for subacute SDH evacuation, POD #3  -Continue keppra for seizure prophylaxis until 8/19  -Zofran prn for nausea/vomiting  -Tylenol prn for pain control  -Pending discharge to rehab  -Neurosx follow up

## 2017-08-15 NOTE — PROGRESS NOTE ADULT - ASSESSMENT
56 y/o male with a h/o of HTN, DM, SDH in 2016 dt fall, p/w 3 days of walking difficulty. Denied trauma. Went to [facility] where CT head showed large right sided subacute SDH with 1.5cm MLS. Admitted on 8/12 [facility] for subacute SDH evacuation, POD #3.   8/12-Post op CT demonstrated hydrocephalus and air. CTA head negative.   8/14-[facility] d/c'd       Plan:  -Continue keppra (till 8/19) for seizure ppx  -IV tylenol, percocet for headaches   -Lantus 19 , HISS for DM. Last   -Losartan/novasc for HTN, elevated BP today likely due to pain. Monitor BP after pain control  -Zofran for nausea/vomiting  -DVT ppx: sq lovenox     DISPO: PT-acute rehab 58 y/o male with a h/o of HTN, DM, SDH in 2016 s/p fall, p/w 3 days of walking difficulty. Denied trauma. Went to [facility] where CT head showed large right sided subacute SDH with 1.5cm MLS. Admitted on 8/12 [facility]/p [facility] for subacute SDH evacuation, POD #3.   8/12-Post op CT demonstrated hydrocephalus and air. CTA head negative.   8/14-[facility] d/c'd       Plan:  -Continue keppra 500q12 (till 8/19) for seizure ppx  -Continue Percocet PRN for headaches  -Endo follow up appreciated. Continue Lantus 19units qHS, Humalog 8units TID AC, moderate HISS for DM. Last   -Continue Losartan/norvasc for HTN, elevated BP today likely due to pain. Monitor BP after pain control  -Zofran PRN for nausea/vomiting  -Encouraged mobilization  -Incentive spirometer  -DVT ppx: SQL, venodynes  -Dispo: PT/OT/PMR-Acute rehab  -Likely D/C to [facility] rehab tomorrow  -discussed with Dr. Guadarrama

## 2017-08-15 NOTE — PROGRESS NOTE ADULT - ASSESSMENT
57-year old man POD#4 [facility] for chronic SDH w/ PT, OT, PMR recommending acute rehab  -disposition planning  -encourage OOB

## 2017-08-15 NOTE — PROGRESS NOTE ADULT - SUBJECTIVE AND OBJECTIVE BOX
Chief Complaint: type 2 diabetes    Interval history: Sugars largely at goal over past 24 hours.  Pt still wtih some AM nausea that precludes him from eating full tray.    MEDICATIONS  (STANDING):  levETIRAcetam 500 milliGRAM(s) Oral every 12 hours  famotidine    Tablet 20 milliGRAM(s) Oral every 12 hours  enoxaparin Injectable 40 milliGRAM(s) SubCutaneous at bedtime  losartan 25 milliGRAM(s) Oral daily  dextrose 50% Injectable 12.5 Gram(s) IV Push once  dextrose 50% Injectable 25 Gram(s) IV Push once  dextrose 50% Injectable 25 Gram(s) IV Push once  insulin lispro (HumaLOG) corrective regimen sliding scale   SubCutaneous three times a day before meals  insulin lispro (HumaLOG) corrective regimen sliding scale   SubCutaneous at bedtime  amLODIPine   Tablet 10 milliGRAM(s) Oral daily  insulin lispro Injectable (HumaLOG) 8 Unit(s) SubCutaneous three times a day before meals  insulin glargine Injectable (LANTUS) 19 Unit(s) SubCutaneous at bedtime  acetaminophen  IVPB. 1000 milliGRAM(s) IV Intermittent once    MEDICATIONS  (PRN):  acetaminophen   Tablet 650 milliGRAM(s) Oral every 6 hours PRN For Temp greater than 38 C (100.4 F)  acetaminophen   Tablet. 650 milliGRAM(s) Oral every 6 hours PRN Mild Pain (1 - 3)  oxyCODONE    5 mG/acetaminophen 325 mG 1 Tablet(s) Oral every 4 hours PRN Moderate Pain  ondansetron Injectable 4 milliGRAM(s) IV Push every 6 hours PRN Nausea and/or Vomiting  oxyCODONE    5 mG/acetaminophen 325 mG 2 Tablet(s) Oral every 4 hours PRN Severe Pain  dextrose Gel 1 Dose(s) Oral once PRN Blood Glucose LESS THAN 70 milliGRAM(s)/deciliter  glucagon  Injectable 1 milliGRAM(s) IntraMuscular once PRN Glucose LESS THAN 70 milligrams/deciliter      Allergies    ampicillin (Short breath)  penicillin (Short breath)  Toprol-XL (Other)    Intolerances      Review of Systems:  Skin: no rash  Endocrine: no polyuria, polydipsia  GI: + nausea    ALL OTHER SYSTEMS REVIEWED AND NEGATIVE    PHYSICAL EXAM:  VITALS: T(C): 36.8 (08-15-17 @ 12:48)  T(F): 98.2 (08-15-17 @ 12:48), Max: 98.7 (08-15-17 @ 05:08)  HR: 64 (08-15-17 @ 12:48) (48 - 91)  BP: 147/99 (08-15-17 @ 12:48) (133/84 - 170/113)  RR:  (16 - 18)  SpO2:  (94% - 100%)  Wt(kg): --  GENERAL: NAD, well-groomed, well-developed  EYES: No proptosis, no lid lag, anicteric  HEENT:  Atraumatic, Normocephalic, moist mucous membranes  SKIN: Dry, intact, No rashes or lesions  PSYCH: Alert and oriented x 3, normal affect, normal mood    CAPILLARY BLOOD GLUCOSE  206 (08-15 @ 12:48)  133 (08-15 @ 08:35)  128 (08-14 @ 22:32)  118 (08-14 @ 18:21)  200 (08-14 @ 12:37)  116 (08-14 @ 08:15)  192 (08-13 @ 23:00)  138 (08-13 @ 18:00)  198 (08-13 @ 13:00)  159 (08-13 @ 08:00)  332 (08-12 @ 17:07)      08-13    139  |  101  |  23  ----------------------------<  192<H>  3.4<L>   |  26  |  0.96    EGFR if : 101  EGFR if non : 87    Ca    8.5      08-13  Mg     1.8     08-13  Phos  2.8     08-13            Thyroid Function Tests:      Hemoglobin A1C, Whole Blood: 7.5 % <H> [4.0 - 5.6] (08-12-17 @ 15:05)

## 2017-08-16 ENCOUNTER — TRANSCRIPTION ENCOUNTER (OUTPATIENT)
Age: 57
End: 2017-08-16

## 2017-08-16 VITALS
DIASTOLIC BLOOD PRESSURE: 97 MMHG | SYSTOLIC BLOOD PRESSURE: 136 MMHG | TEMPERATURE: 98 F | HEART RATE: 70 BPM | OXYGEN SATURATION: 97 % | RESPIRATION RATE: 18 BRPM

## 2017-08-16 DIAGNOSIS — G47.33 OBSTRUCTIVE SLEEP APNEA (ADULT) (PEDIATRIC): ICD-10-CM

## 2017-08-16 DIAGNOSIS — R07.89 OTHER CHEST PAIN: ICD-10-CM

## 2017-08-16 LAB
ANION GAP SERPL CALC-SCNC: 14 MMOL/L — SIGNIFICANT CHANGE UP (ref 5–17)
BUN SERPL-MCNC: 15 MG/DL — SIGNIFICANT CHANGE UP (ref 7–23)
CALCIUM SERPL-MCNC: 9.2 MG/DL — SIGNIFICANT CHANGE UP (ref 8.4–10.5)
CHLORIDE SERPL-SCNC: 98 MMOL/L — SIGNIFICANT CHANGE UP (ref 96–108)
CK MB BLD-MCNC: 3.1 % — SIGNIFICANT CHANGE UP (ref 0–3.5)
CK MB BLD-MCNC: 3.5 % — SIGNIFICANT CHANGE UP (ref 0–3.5)
CK MB CFR SERPL CALC: 5 NG/ML — SIGNIFICANT CHANGE UP (ref 0–6.7)
CK MB CFR SERPL CALC: 5.1 NG/ML — SIGNIFICANT CHANGE UP (ref 0–6.7)
CK SERPL-CCNC: 147 U/L — SIGNIFICANT CHANGE UP (ref 30–200)
CK SERPL-CCNC: 160 U/L — SIGNIFICANT CHANGE UP (ref 30–200)
CO2 SERPL-SCNC: 28 MMOL/L — SIGNIFICANT CHANGE UP (ref 22–31)
CREAT SERPL-MCNC: 0.79 MG/DL — SIGNIFICANT CHANGE UP (ref 0.5–1.3)
GLUCOSE SERPL-MCNC: 132 MG/DL — HIGH (ref 70–99)
HCT VFR BLD CALC: 46.7 % — SIGNIFICANT CHANGE UP (ref 39–50)
HGB BLD-MCNC: 16 G/DL — SIGNIFICANT CHANGE UP (ref 13–17)
MCHC RBC-ENTMCNC: 29.8 PG — SIGNIFICANT CHANGE UP (ref 27–34)
MCHC RBC-ENTMCNC: 34.2 GM/DL — SIGNIFICANT CHANGE UP (ref 32–36)
MCV RBC AUTO: 87.1 FL — SIGNIFICANT CHANGE UP (ref 80–100)
PLATELET # BLD AUTO: 273 K/UL — SIGNIFICANT CHANGE UP (ref 150–400)
POTASSIUM SERPL-MCNC: 3.3 MMOL/L — LOW (ref 3.5–5.3)
POTASSIUM SERPL-SCNC: 3.3 MMOL/L — LOW (ref 3.5–5.3)
RBC # BLD: 5.36 M/UL — SIGNIFICANT CHANGE UP (ref 4.2–5.8)
RBC # FLD: 13.4 % — SIGNIFICANT CHANGE UP (ref 10.3–14.5)
SODIUM SERPL-SCNC: 140 MMOL/L — SIGNIFICANT CHANGE UP (ref 135–145)
TROPONIN T SERPL-MCNC: <0.01 NG/ML — SIGNIFICANT CHANGE UP (ref 0–0.06)
TROPONIN T SERPL-MCNC: <0.01 NG/ML — SIGNIFICANT CHANGE UP (ref 0–0.06)
WBC # BLD: 13.5 K/UL — HIGH (ref 3.8–10.5)
WBC # FLD AUTO: 13.5 K/UL — HIGH (ref 3.8–10.5)

## 2017-08-16 PROCEDURE — 96374 THER/PROPH/DIAG INJ IV PUSH: CPT

## 2017-08-16 PROCEDURE — 99232 SBSQ HOSP IP/OBS MODERATE 35: CPT

## 2017-08-16 PROCEDURE — 93306 TTE W/DOPPLER COMPLETE: CPT

## 2017-08-16 PROCEDURE — 86850 RBC ANTIBODY SCREEN: CPT

## 2017-08-16 PROCEDURE — 84100 ASSAY OF PHOSPHORUS: CPT

## 2017-08-16 PROCEDURE — C1769: CPT

## 2017-08-16 PROCEDURE — 99291 CRITICAL CARE FIRST HOUR: CPT | Mod: 25

## 2017-08-16 PROCEDURE — 83605 ASSAY OF LACTIC ACID: CPT

## 2017-08-16 PROCEDURE — 83036 HEMOGLOBIN GLYCOSYLATED A1C: CPT

## 2017-08-16 PROCEDURE — 71045 X-RAY EXAM CHEST 1 VIEW: CPT

## 2017-08-16 PROCEDURE — 82565 ASSAY OF CREATININE: CPT

## 2017-08-16 PROCEDURE — 82435 ASSAY OF BLOOD CHLORIDE: CPT

## 2017-08-16 PROCEDURE — 70450 CT HEAD/BRAIN W/O DYE: CPT

## 2017-08-16 PROCEDURE — 99233 SBSQ HOSP IP/OBS HIGH 50: CPT

## 2017-08-16 PROCEDURE — 85027 COMPLETE CBC AUTOMATED: CPT

## 2017-08-16 PROCEDURE — 82553 CREATINE MB FRACTION: CPT

## 2017-08-16 PROCEDURE — 84484 ASSAY OF TROPONIN QUANT: CPT

## 2017-08-16 PROCEDURE — 80053 COMPREHEN METABOLIC PANEL: CPT

## 2017-08-16 PROCEDURE — 83735 ASSAY OF MAGNESIUM: CPT

## 2017-08-16 PROCEDURE — 93005 ELECTROCARDIOGRAM TRACING: CPT

## 2017-08-16 PROCEDURE — 82330 ASSAY OF CALCIUM: CPT

## 2017-08-16 PROCEDURE — 84132 ASSAY OF SERUM POTASSIUM: CPT

## 2017-08-16 PROCEDURE — 97161 PT EVAL LOW COMPLEX 20 MIN: CPT

## 2017-08-16 PROCEDURE — 97116 GAIT TRAINING THERAPY: CPT

## 2017-08-16 PROCEDURE — 82550 ASSAY OF CK (CPK): CPT

## 2017-08-16 PROCEDURE — 70496 CT ANGIOGRAPHY HEAD: CPT

## 2017-08-16 PROCEDURE — 82947 ASSAY GLUCOSE BLOOD QUANT: CPT

## 2017-08-16 PROCEDURE — C1713: CPT

## 2017-08-16 PROCEDURE — 80048 BASIC METABOLIC PNL TOTAL CA: CPT

## 2017-08-16 PROCEDURE — C1889: CPT

## 2017-08-16 PROCEDURE — 86901 BLOOD TYPING SEROLOGIC RH(D): CPT

## 2017-08-16 PROCEDURE — 86900 BLOOD TYPING SEROLOGIC ABO: CPT

## 2017-08-16 PROCEDURE — 97166 OT EVAL MOD COMPLEX 45 MIN: CPT

## 2017-08-16 PROCEDURE — 84295 ASSAY OF SERUM SODIUM: CPT

## 2017-08-16 PROCEDURE — 93010 ELECTROCARDIOGRAM REPORT: CPT

## 2017-08-16 PROCEDURE — 80061 LIPID PANEL: CPT

## 2017-08-16 PROCEDURE — 97530 THERAPEUTIC ACTIVITIES: CPT

## 2017-08-16 PROCEDURE — 85014 HEMATOCRIT: CPT

## 2017-08-16 PROCEDURE — 82803 BLOOD GASES ANY COMBINATION: CPT

## 2017-08-16 RX ORDER — SIMETHICONE 80 MG/1
1 TABLET, CHEWABLE ORAL
Qty: 0 | Refills: 0 | COMMUNITY
Start: 2017-08-16

## 2017-08-16 RX ORDER — INSULIN LISPRO 100/ML
0 VIAL (ML) SUBCUTANEOUS
Qty: 0 | Refills: 0 | COMMUNITY
Start: 2017-08-16

## 2017-08-16 RX ORDER — POTASSIUM CHLORIDE 20 MEQ
20 PACKET (EA) ORAL ONCE
Qty: 0 | Refills: 0 | Status: COMPLETED | OUTPATIENT
Start: 2017-08-16 | End: 2017-08-16

## 2017-08-16 RX ORDER — AMLODIPINE BESYLATE 2.5 MG/1
1 TABLET ORAL
Qty: 0 | Refills: 0 | COMMUNITY
Start: 2017-08-16

## 2017-08-16 RX ORDER — ASPIRIN/CALCIUM CARB/MAGNESIUM 324 MG
1 TABLET ORAL
Qty: 0 | Refills: 0 | COMMUNITY

## 2017-08-16 RX ORDER — LOSARTAN POTASSIUM 100 MG/1
1 TABLET, FILM COATED ORAL
Qty: 0 | Refills: 0 | COMMUNITY
Start: 2017-08-16

## 2017-08-16 RX ORDER — INSULIN GLARGINE 100 [IU]/ML
19 INJECTION, SOLUTION SUBCUTANEOUS
Qty: 0 | Refills: 0 | COMMUNITY
Start: 2017-08-16

## 2017-08-16 RX ORDER — LOSARTAN POTASSIUM 100 MG/1
0 TABLET, FILM COATED ORAL
Qty: 0 | Refills: 0 | COMMUNITY

## 2017-08-16 RX ORDER — OXYCODONE HYDROCHLORIDE 5 MG/1
2 TABLET ORAL
Qty: 0 | Refills: 0 | COMMUNITY
Start: 2017-08-16

## 2017-08-16 RX ORDER — FAMOTIDINE 10 MG/ML
1 INJECTION INTRAVENOUS
Qty: 0 | Refills: 0 | COMMUNITY
Start: 2017-08-16

## 2017-08-16 RX ORDER — LEVETIRACETAM 250 MG/1
1 TABLET, FILM COATED ORAL
Qty: 0 | Refills: 0 | COMMUNITY
Start: 2017-08-16

## 2017-08-16 RX ORDER — ACETAMINOPHEN 500 MG
2 TABLET ORAL
Qty: 0 | Refills: 0 | COMMUNITY
Start: 2017-08-16

## 2017-08-16 RX ORDER — OXYCODONE HYDROCHLORIDE 5 MG/1
1 TABLET ORAL
Qty: 0 | Refills: 0 | COMMUNITY
Start: 2017-08-16

## 2017-08-16 RX ORDER — POTASSIUM CHLORIDE 20 MEQ
40 PACKET (EA) ORAL EVERY 4 HOURS
Qty: 0 | Refills: 0 | Status: DISCONTINUED | OUTPATIENT
Start: 2017-08-16 | End: 2017-08-16

## 2017-08-16 RX ORDER — INSULIN LISPRO 100/ML
8 VIAL (ML) SUBCUTANEOUS
Qty: 0 | Refills: 0 | COMMUNITY
Start: 2017-08-16

## 2017-08-16 RX ORDER — ENOXAPARIN SODIUM 100 MG/ML
40 INJECTION SUBCUTANEOUS
Qty: 0 | Refills: 0 | COMMUNITY
Start: 2017-08-16

## 2017-08-16 RX ORDER — SIMETHICONE 80 MG/1
80 TABLET, CHEWABLE ORAL DAILY
Qty: 0 | Refills: 0 | Status: DISCONTINUED | OUTPATIENT
Start: 2017-08-16 | End: 2017-08-16

## 2017-08-16 RX ADMIN — Medication 20 MILLIEQUIVALENT(S): at 12:36

## 2017-08-16 RX ADMIN — OXYCODONE AND ACETAMINOPHEN 2 TABLET(S): 5; 325 TABLET ORAL at 15:35

## 2017-08-16 RX ADMIN — LEVETIRACETAM 500 MILLIGRAM(S): 250 TABLET, FILM COATED ORAL at 06:14

## 2017-08-16 RX ADMIN — FAMOTIDINE 20 MILLIGRAM(S): 10 INJECTION INTRAVENOUS at 06:14

## 2017-08-16 RX ADMIN — Medication 8 UNIT(S): at 13:36

## 2017-08-16 RX ADMIN — LOSARTAN POTASSIUM 50 MILLIGRAM(S): 100 TABLET, FILM COATED ORAL at 06:14

## 2017-08-16 RX ADMIN — Medication 8 UNIT(S): at 18:13

## 2017-08-16 RX ADMIN — SIMETHICONE 80 MILLIGRAM(S): 80 TABLET, CHEWABLE ORAL at 12:36

## 2017-08-16 RX ADMIN — Medication 8 UNIT(S): at 08:47

## 2017-08-16 RX ADMIN — LEVETIRACETAM 500 MILLIGRAM(S): 250 TABLET, FILM COATED ORAL at 18:13

## 2017-08-16 RX ADMIN — Medication 2: at 13:36

## 2017-08-16 RX ADMIN — FAMOTIDINE 20 MILLIGRAM(S): 10 INJECTION INTRAVENOUS at 18:13

## 2017-08-16 NOTE — PROGRESS NOTE ADULT - SUBJECTIVE AND OBJECTIVE BOX
Diabetes Follow up note:  Interval Hx:  57 year old male w/uncontrolled T2DM w/neuropathy here w/SDH s/p [facility]. Pt seen at bedside. Pt was taking Lantus and metformin at home but says he was taking Lantus after meals depending on BG value. Here he has been relatively well controlled on basal/bolus. Awaiting rehab bed per staff.     Review of Systems:  General: No complaints.   GI: Tolerating POs without any N/V/D/ABD PAIN.  CV: No CP/SOB  ENDO: No S&Sx of hypoglycemia  MEDS:  insulin lispro (HumaLOG) corrective regimen sliding scale   SubCutaneous three times a day before meals  insulin lispro (HumaLOG) corrective regimen sliding scale   SubCutaneous at bedtime  insulin lispro Injectable (HumaLOG) 8 Unit(s) SubCutaneous three times a day before meals  insulin glargine Injectable (LANTUS) 19 Unit(s) SubCutaneous at bedtime      Allergies    ampicillin (Short breath)  penicillin (Short breath)  Toprol-XL (Other)        PE:  General: Male lying in bed. NAD  Vital Signs Last 24 Hrs  T(C): 36.6 (16 Aug 2017 08:12), Max: 36.8 (15 Aug 2017 23:49)  T(F): 97.8 (16 Aug 2017 08:12), Max: 98.3 (15 Aug 2017 23:49)  HR: 79 (16 Aug 2017 12:28) (52 - 93)  BP: 159/96 (16 Aug 2017 12:28) (125/81 - 159/96)  BP(mean): --  RR: 18 (16 Aug 2017 12:28) (16 - 18)  SpO2: 95% (16 Aug 2017 12:28) (95% - 99%)  Abd: Soft, NT,ND,   Extremities: Warm. no edema  Neuro: A&O X 2    LABS:  CAPILLARY BLOOD GLUCOSE  160 (08-16 @ 12:09)  123 (08-16 @ 08:12)  144 (08-15 @ 21:11)  147 (08-15 @ 17:16)  206 (08-15 @ 12:48)  133 (08-15 @ 08:35)  128 (08-14 @ 22:32)  118 (08-14 @ 18:21)  200 (08-14 @ 12:37)  116 (08-14 @ 08:15)  192 (08-13 @ 23:00)  138 (08-13 @ 18:00)                            16.0   13.5  )-----------( 273      ( 16 Aug 2017 06:34 )             46.7       08-16    140  |  98  |  15  ----------------------------<  132<H>  3.3<L>   |  28  |  0.79    Ca    9.2      16 Aug 2017 06:34        Hemoglobin A1C, Whole Blood: 7.5 % <H> [4.0 - 5.6] (08-12-17 @ 15:05)            Contact number: arpan [phone] or [phone]

## 2017-08-16 NOTE — PROGRESS NOTE ADULT - ASSESSMENT
57-year old POD#4 [facility] for subdural hematoma on right  -IM follow up appreciated for dyspepsia  -Pending acute rehab, PT called for an updated evaluation to help assist in his placement  -Endocrinology input appreciated

## 2017-08-16 NOTE — PROGRESS NOTE ADULT - PROBLEM SELECTOR PLAN 6
per PMD, not on  CPAP, but per pateitn occasinal O2.  Titrated off 2 L in room 95% on RA. Monitor off of O2.

## 2017-08-16 NOTE — DISCHARGE NOTE ADULT - MEDICATION SUMMARY - MEDICATIONS TO TAKE
I will START or STAY ON the medications listed below when I get home from the hospital:    acetaminophen 325 mg oral tablet  -- 2 tab(s) by mouth every 6 hours, As needed, Mild Pain (1 - 3)  -- Indication: For mild pain    acetaminophen-oxycodone 325 mg-5 mg oral tablet  -- 1 tab(s) by mouth every 4 hours, As needed, Moderate Pain  -- Indication: For moderate pain    acetaminophen-oxycodone 325 mg-5 mg oral tablet  -- 2 tab(s) by mouth every 4 hours, As needed, Severe Pain  -- Indication: For Severe pain    losartan 50 mg oral tablet  -- 1 tab(s) by mouth once a day  -- Indication: For Hypertenesion    enoxaparin  -- 40 milligram(s) subcutaneous once a day (at bedtime)  -- Indication: For Dvt ppx     levETIRAcetam 500 mg oral tablet  -- 1 tab(s) by mouth every 12 hours  -- Indication: For Seizure     insulin lispro 100 units/mL subcutaneous solution  --  subcutaneous 3 times a day (before meals); 2 Unit(s) if Glucose 151 - 200  4 Unit(s) if Glucose 201 - 250  6 Unit(s) if Glucose 251 - 300  8 Unit(s) if Glucose 301 - 350  10 Unit(s) if Glucose 351 - 400  12 Unit(s) if Glucose Greater Than 400  -- Indication: For Diabetes    insulin lispro 100 units/mL subcutaneous solution  --  subcutaneous once a day (at bedtime); 0 Unit(s) if Glucose 61 - 250  2 Unit(s) if Glucose 251 - 300  4 Unit(s) if Glucose 301 - 350  6 Unit(s) if Glucose 351 - 400  8 Unit(s) if Glucose Greater Than 400  -- Indication: For Diabetes    insulin lispro 100 units/mL subcutaneous solution  -- 8 unit(s) subcutaneous 3 times a day (before meals)  -- Indication: For Diabetes    insulin glargine  -- 19 unit(s) subcutaneous once a day (at bedtime)  -- Indication: For Diabetes    metFORMIN  --  by mouth   -- Indication: For Diabetes     atorvastatin 10 mg oral tablet  -- 1 tab(s) by mouth once a day (at bedtime)  -- Indication: For Hyperlipidemia, unspecified hyperlipidemia type    amLODIPine 10 mg oral tablet  -- 1 tab(s) by mouth once a day  -- Indication: For Hypertension    famotidine 20 mg oral tablet  -- 1 tab(s) by mouth every 12 hours  -- Indication: For gerd    Colace 100 mg oral capsule  -- 1 cap(s) by mouth once a day  -- Medication should be taken with plenty of water.    -- Indication: For Constipation     MiraLax oral powder for reconstitution  -- 17 gram(s) dissolved into eight ounces of juice or water by mouth once a day  -- Dilute this medication with liquid before administration.  It is very important that you take or use this exactly as directed.  Do not skip doses or discontinue unless directed by your doctor.    -- Indication: For Constipation     simethicone 80 mg oral tablet, chewable  -- 1 tab(s) by mouth once a day  -- Indication: For Dyspepsia

## 2017-08-16 NOTE — PROGRESS NOTE ADULT - PROBLEM SELECTOR PROBLEM 1
Chest pain, atypical
Uncontrolled type 2 diabetes mellitus with hyperglycemia, with long-term current use of insulin

## 2017-08-16 NOTE — DISCHARGE NOTE ADULT - PLAN OF CARE
[facility] for evacuation of sdh Please make a follow up appointment with Dr. Guadarrama after discharge. Call [phone] to schedule an appointment. Staples can be removed on 8/22/17 while at rehab or at follow up appointment if you are not at rehab anymore. Keep incision site clean and dry. No creams, ointments, or lotions to incision area.     NO heavy lifting, strenuous activity, bending, twisting, driving, or working until cleared by Dr. Guadarrama.  Please return to the emergency department if you develop changes in mental status, seizures, fainting, dizziness, changes in vision, lethargy, nausea, vomiting, chest pain, shortness of breathe or severe pain. Please make an appointment for follow up with your primary care physician Dr. Noel within 1 week of discharge. Continue Losartan 50 mg daily. Please make an appointment for follow up with your primary care physician Dr. Noel within 1 week of discharge. -you were on lantus 4 units and metformin at home.  You were changed to lantus and humolog at the hospital by our endocrinology team and will be going home on lantus and humolog.  Please follow up with Dr. Noel within 1 week of discharge for diabetes mellitus check as well as to send you to an endocrinologist.  Please check your fingersticks prior to meals and at bedside -please conitnue Pepcid  -andressae follow up wtih your PMD for follow up for dyspepsia given above age 50.

## 2017-08-16 NOTE — DISCHARGE NOTE ADULT - MEDICATION SUMMARY - MEDICATIONS TO CHANGE
I will SWITCH the dose or number of times a day I take the medications listed below when I get home from the hospital:    Lantus  -- 19 unit(s) subcutaneous 3 times a day (before meals)    NovoLog  -- 6 unit(s) subcutaneous once a day (at bedtime)    amLODIPine 5 mg oral tablet  -- 1 tab(s) by mouth once a day

## 2017-08-16 NOTE — PROGRESS NOTE ADULT - PROBLEM SELECTOR PLAN 4
endo appreciated, improved glycemic control, continue current regimen endo appreciated, improved glycemic control, continue current regimen  -d/w primary care physician, Dr. Hopkins, on Lantus 4units and metformin at home.  Does not have name of endocrinologist.

## 2017-08-16 NOTE — PROGRESS NOTE ADULT - PROBLEM SELECTOR PLAN 1
-test BG AC/HS  -c/w Lantus 19 units qhs  -c/w Humalog 8 units AC meals  -c/w Humalog moderate correction scales (AC and HS)  -can be discharged on current insulin regimen at rehab  -discussed w/pt and staff

## 2017-08-16 NOTE — PROGRESS NOTE ADULT - SUBJECTIVE AND OBJECTIVE BOX
Patient is a 57y old  Male who presents with a chief complaint of SDH, transfer from [place] (12 Aug 2017 05:19)      SUBJECTIVE / OVERNIGHT EVENTS: No acute events overnight, Patient complaining of abdominal and chest pain this AM, occurring prior to breakfast after taking pills.  States has been occuring last couple of months, mostly in morning after takes pills but before breakfast.  Throbbing pain in midepigastrum and in middle of hcest.  Slightly tender to palpation in chest, not radiating, no associated nauesesa or emesis or diaphoresis.      MEDICATIONS  (STANDING):  levETIRAcetam 500 milliGRAM(s) Oral every 12 hours  famotidine    Tablet 20 milliGRAM(s) Oral every 12 hours  enoxaparin Injectable 40 milliGRAM(s) SubCutaneous at bedtime  losartan 25 milliGRAM(s) Oral daily  dextrose 50% Injectable 12.5 Gram(s) IV Push once  dextrose 50% Injectable 25 Gram(s) IV Push once  dextrose 50% Injectable 25 Gram(s) IV Push once  insulin lispro (HumaLOG) corrective regimen sliding scale   SubCutaneous three times a day before meals  insulin lispro (HumaLOG) corrective regimen sliding scale   SubCutaneous at bedtime  amLODIPine   Tablet 10 milliGRAM(s) Oral daily  insulin lispro Injectable (HumaLOG) 8 Unit(s) SubCutaneous three times a day before meals  insulin glargine Injectable (LANTUS) 19 Unit(s) SubCutaneous at bedtime  acetaminophen  IVPB. 1000 milliGRAM(s) IV Intermittent once    MEDICATIONS  (PRN):  acetaminophen   Tablet 650 milliGRAM(s) Oral every 6 hours PRN For Temp greater than 38 C (100.4 F)  acetaminophen   Tablet. 650 milliGRAM(s) Oral every 6 hours PRN Mild Pain (1 - 3)  oxyCODONE    5 mG/acetaminophen 325 mG 1 Tablet(s) Oral every 4 hours PRN Moderate Pain  ondansetron Injectable 4 milliGRAM(s) IV Push every 6 hours PRN Nausea and/or Vomiting  oxyCODONE    5 mG/acetaminophen 325 mG 2 Tablet(s) Oral every 4 hours PRN Severe Pain  dextrose Gel 1 Dose(s) Oral once PRN Blood Glucose LESS THAN 70 milliGRAM(s)/deciliter  glucagon  Injectable 1 milliGRAM(s) IntraMuscular once PRN Glucose LESS THAN 70 milligrams/deciliter        CAPILLARY BLOOD GLUCOSE  206 (15 Aug 2017 12:48)  133 (15 Aug 2017 08:35)  128 (14 Aug 2017 22:32)  118 (14 Aug 2017 18:21)        Vital Signs Last 24 Hrs  T(C): 36.6 (16 Aug 2017 08:12), Max: 36.8 (15 Aug 2017 12:48)  T(F): 97.8 (16 Aug 2017 08:12), Max: 98.3 (15 Aug 2017 23:49)  HR: 52 (16 Aug 2017 08:12) (52 - 93)  BP: 145/92 (16 Aug 2017 08:12) (125/81 - 159/92)  BP(mean): --  RR: 18 (16 Aug 2017 08:12) (16 - 18)  SpO2: 98% (16 Aug 2017 08:12) (95% - 99%)      PHYSICAL EXAM:  GENERAL: NAD, well-developed  HEAD:  wound site c/d/i  EYES: EOMI, PERRLA, conjunctiva and sclera clear  NECK: Supple, No JVD  CHEST/LUNG: Clear to auscultation bilaterally; No wheeze, rhonchi, rales  HEART: Regular rate and rhythm; S1S2  ABDOMEN: +TTP midepigastric region and in mid-sternum to palpation.  EXTREMITIES:  2+ Peripheral Pulses, No clubbing, cyanosis, or edema  PSYCH: AAOx2, confused     LABS:             Troponin T, Serum (08.16.17 @ 06:34)    Troponin T, Serum: <0.01: Reference Interval for Troponin T  Less than 0.06 ng/mL - includes the 99th percentile of a healthy  population at a method C.V. of 10% or less.  NOTE: Troponin T is measured by the Roche ECLIA method and these  results are not interchangable with Tro<0.01: ponin I results since they are  different assays with different reference intervals. ng/mL    Creatine Kinase, Serum: 160 U/L (08.16.17 @ 06:34)  ?Complete Blood Count Discharge (08.16.17 @ 06:34)    WBC Count: 13.5 K/uL    RBC Count: 5.36 M/uL    Hemoglobin: 16.0 g/dL    Hematocrit: 46.7 %    Mean Cell Volume: 87.1 fl    Mean Cell Hemoglobin: 29.8 pg    Mean Cell Hemoglobin Conc: 34.2 gm/dL    Red Cell Distrib Width: 13.4 %    Platelet Count - Automated: 273 K/uL    Basic Metabolic Panel (08.16.17 @ 06:34)    Sodium, Serum: 140 mmol/L    Potassium, Serum: 3.3 mmol/L    Chloride, Serum: 98 mmol/L    Carbon Dioxide, Serum: 28 mmol/L    Anion Gap, Serum: 14 mmol/L    Blood Urea Nitrogen, Serum: 15 mg/dL    Creatinine, Serum: 0.79 mg/dL    Glucose, Serum: 132 mg/dL    Calcium, Total Serum: 9.2 mg/dL    eGFR if Non : 100: Interpretative comment  The units for eGFR are ml/min/1.73m2 (normalized body surface area). The  eGFR is calculated from a serum creatinine using the CKD-EPI equation.  Other variables required for calculation are race, age and sex. Among  patients w100: ith chronic kidney disease (CKD), the eGFR is useful in  determining the stage of disease according to KDOQI CKD classification.  All eGFR results are reported numerically with the following  interpretation.          GFR                    [id number]:                  Without     (ml/min/1.73 m2)    Kidney Damage       Kidney Damage        >= 90                    Stage 1                     Normal        60-89                    Stage 2                     Decreased GFR        [id number]:      Stage 3                     Stage 3        15-29                    Stage 4                     Stage 4        < 15                      Stage 5                     Stage 5  Each stage of CKD assumes that the associated GFR level has been in  imy391: ect for at least 3 months. Determination of stages one and two (with  eGFR > 59 ml/min/m2) requires estimation of kidney damage for at least 3  months as defined by structural or functional abnormalities.  Limitations: All estimates of GFR will be gnl375: s accurate for patients at  extremes of muscle mass (including but not limited to frail elderly,  critically ill, or cancer patients), those with unusual diets, and those  with conditions associated with reduced secretion or extrarenal  elimination of100:  creatinine. The eGFR equation is not recommended for use  in patients with unstable creatinine levels. mL/min/1.73M2    eGFR if African American: 116 mL/min/1.73M2                      RADIOLOGY & ADDITIONAL TESTS:  < from: CT Head No Cont (08.14.17 @ 09:36) >    IMPRESSION:  Decreased intracranial air. Otherwise stable examination.        < end of copied text >      Imaging Personally Reviewed:    Consultant(s) Notes Reviewed:  [facility]    Care Discussed with Consultants/Other Providers: [facility] ([facility]adi), PCP: Dr. [phone]

## 2017-08-16 NOTE — DISCHARGE NOTE ADULT - NS AS ACTIVITY OBS
Showering allowed/Do not make important decisions/No Heavy lifting/straining/Do not drive or operate machinery/Walking-Indoors allowed/Walking-Outdoors allowed

## 2017-08-16 NOTE — PROGRESS NOTE ADULT - SUBJECTIVE AND OBJECTIVE BOX
Patient seen and examined this morning  No complaints  AAOx3  FC  PERRL, EOMI, face symmetric, tongue midline  BOLDEN 5/5   No drift   Incision is clean, dry, intact w/ staples

## 2017-08-16 NOTE — PROGRESS NOTE ADULT - PROBLEM SELECTOR PLAN 1
patient with atypical chest pain, occurring inttermitently after eating for weeks to months, espcially in mornign after ortiz pills  -improved with eruptation during examination.  -normal TTE  -no evidence of prior ECG, does have risk factors, even though atypical will check ECG and tropx2.     -simethicone   -contineu pepcid  -outpatient follow up with PMD for dyspepsia workup in patient over 51 yo for possible EGD

## 2017-08-16 NOTE — DISCHARGE NOTE ADULT - SECONDARY DIAGNOSIS.
Essential hypertension High cholesterol History of appendectomy Obstructive sleep apnea Uncontrolled type 2 diabetes mellitus with hyperglycemia, with long-term current use of insulin Dyspepsia

## 2017-08-16 NOTE — PROGRESS NOTE ADULT - ASSESSMENT
57 year old male w/Type 2 diabetes w/neuropathy here w/SDH s/p Morton hole surgery. Glycemic control at goal on current regimen. Would continue insulin regimen at rehab upon discharge. Pt unaware of name of endocrinologist and isn't able to articulate exact medication regimen at home.

## 2017-08-16 NOTE — DISCHARGE NOTE ADULT - CARE PROVIDER_API CALL
Clay Guadarrama (MD), Neurological Surgery  [address] [place] [address]  Phone: [phone]  Fax: [phone]    Davie Noel (), [place] [address]  Phone: [phone]  Fax: [phone]

## 2017-08-16 NOTE — DISCHARGE NOTE ADULT - NS AS DC STROKE ED MATERIALS
Call 911 for Stroke/Prescribed Medications/Need for Followup After Discharge/Stroke Education Booklet/Risk Factors for Stroke/Stroke Warning Signs and Symptoms

## 2017-08-16 NOTE — DISCHARGE NOTE ADULT - CARE PLAN
Principal Discharge DX:	Subdural hematoma  Goal:	s/p mary grace hole for evacuation of sdh  Secondary Diagnosis:	Essential hypertension  Secondary Diagnosis:	High cholesterol  Secondary Diagnosis:	History of appendectomy  Secondary Diagnosis:	Obstructive sleep apnea  Secondary Diagnosis:	Uncontrolled type 2 diabetes mellitus with hyperglycemia, with long-term current use of insulin Principal Discharge DX:	Subdural hematoma  Goal:	s/p mary grace hole for evacuation of sdh  Instructions for follow-up, activity and diet:	Please make a follow up appointment with Dr. Guadarrama after discharge. Call [phone] to schedule an appointment. Staples can be removed on 8/22/17 while at rehab or at follow up appointment if you are not at rehab anymore. Keep incision site clean and dry. No creams, ointments, or lotions to incision area.     NO heavy lifting, strenuous activity, bending, twisting, driving, or working until cleared by Dr. Guadarrama.  Please return to the emergency department if you develop changes in mental status, seizures, fainting, dizziness, changes in vision, lethargy, nausea, vomiting, chest pain, shortness of breathe or severe pain.  Secondary Diagnosis:	Essential hypertension  Instructions for follow-up, activity and diet:	Please make an appointment for follow up with your primary care physician Dr. Noel within 1 week of discharge. Continue Losartan 50 mg daily.  Secondary Diagnosis:	High cholesterol  Instructions for follow-up, activity and diet:	Please make an appointment for follow up with your primary care physician Dr. Noel within 1 week of discharge.  Secondary Diagnosis:	History of appendectomy  Instructions for follow-up, activity and diet:	Please make an appointment for follow up with your primary care physician Dr. Noel within 1 week of discharge.  Secondary Diagnosis:	Obstructive sleep apnea  Instructions for follow-up, activity and diet:	Please make an appointment for follow up with your primary care physician Dr. Noel within 1 week of discharge.  Secondary Diagnosis:	Uncontrolled type 2 diabetes mellitus with hyperglycemia, with long-term current use of insulin  Instructions for follow-up, activity and diet:	-you were on lantus 4 units and metformin at home.  You were changed to lantus and humolog at the hospital by our endocrinology team and will be going home on lantus and humolog.  Please follow up with Dr. Noel within 1 week of discharge for diabetes mellitus check as well as to send you to an endocrinologist.  Please check your fingersticks prior to meals and at bedside  Secondary Diagnosis:	Dyspepsia  Instructions for follow-up, activity and diet:	-please conitnue Pepcid  -plesae follow up wtih your PMD for follow up for dyspepsia given above age 50. Principal Discharge DX:	Subdural hematoma  Goal:	s/p mary grace hole for evacuation of sdh  Instructions for follow-up, activity and diet:	Please make a follow up appointment with Dr. Guadarrama after discharge. Call [phone] to schedule an appointment. Staples can be removed on 8/22/17 while at rehab or at follow up appointment if you are not at rehab anymore. Keep incision site clean and dry. No creams, ointments, or lotions to incision area.     NO heavy lifting, strenuous activity, bending, twisting, driving, or working until cleared by Dr. Guadarrama.  Please return to the emergency department if you develop changes in mental status, seizures, fainting, dizziness, changes in vision, lethargy, nausea, vomiting, chest pain, shortness of breathe or severe pain.  Secondary Diagnosis:	Essential hypertension  Instructions for follow-up, activity and diet:	Please make an appointment for follow up with your primary care physician Dr. Noel within 1 week of discharge. Continue Losartan 50 mg daily.  Secondary Diagnosis:	High cholesterol  Instructions for follow-up, activity and diet:	Please make an appointment for follow up with your primary care physician Dr. Noel within 1 week of discharge.  Secondary Diagnosis:	History of appendectomy  Instructions for follow-up, activity and diet:	Please make an appointment for follow up with your primary care physician Dr. Noel within 1 week of discharge.  Secondary Diagnosis:	Obstructive sleep apnea  Instructions for follow-up, activity and diet:	Please make an appointment for follow up with your primary care physician Dr. Noel within 1 week of discharge.  Secondary Diagnosis:	Uncontrolled type 2 diabetes mellitus with hyperglycemia, with long-term current use of insulin  Instructions for follow-up, activity and diet:	-you were on lantus 4 units and metformin at home.  You were changed to lantus and humolog at the hospital by our endocrinology team and will be going home on lantus and humolog.  Please follow up with Dr. Noel within 1 week of discharge for diabetes mellitus check as well as to send you to an endocrinologist.  Please check your fingersticks prior to meals and at bedside  Secondary Diagnosis:	Dyspepsia  Instructions for follow-up, activity and diet:	-please conitnue Pepcid  -plesae follow up wtih your PMD for follow up for dyspepsia given above age 50. Principal Discharge DX:	Subdural hematoma  Goal:	s/p mary grace hole for evacuation of sdh  Instructions for follow-up, activity and diet:	Please make a follow up appointment with Dr. Guadarrama after discharge. Call [phone] to schedule an appointment. Staples can be removed on 8/22/17 while at rehab or at follow up appointment if you are not at rehab anymore. Keep incision site clean and dry. No creams, ointments, or lotions to incision area.     NO heavy lifting, strenuous activity, bending, twisting, driving, or working until cleared by Dr. Guadarrama.  Please return to the emergency department if you develop changes in mental status, seizures, fainting, dizziness, changes in vision, lethargy, nausea, vomiting, chest pain, shortness of breathe or severe pain.  Secondary Diagnosis:	Essential hypertension  Instructions for follow-up, activity and diet:	Please make an appointment for follow up with your primary care physician Dr. Noel within 1 week of discharge. Continue Losartan 50 mg daily.  Secondary Diagnosis:	High cholesterol  Instructions for follow-up, activity and diet:	Please make an appointment for follow up with your primary care physician Dr. Noel within 1 week of discharge.  Secondary Diagnosis:	History of appendectomy  Instructions for follow-up, activity and diet:	Please make an appointment for follow up with your primary care physician Dr. Noel within 1 week of discharge.  Secondary Diagnosis:	Obstructive sleep apnea  Instructions for follow-up, activity and diet:	Please make an appointment for follow up with your primary care physician Dr. Noel within 1 week of discharge.  Secondary Diagnosis:	Uncontrolled type 2 diabetes mellitus with hyperglycemia, with long-term current use of insulin  Instructions for follow-up, activity and diet:	-you were on lantus 4 units and metformin at home.  You were changed to lantus and humolog at the hospital by our endocrinology team and will be going home on lantus and humolog.  Please follow up with Dr. Noel within 1 week of discharge for diabetes mellitus check as well as to send you to an endocrinologist.  Please check your fingersticks prior to meals and at bedside  Secondary Diagnosis:	Dyspepsia  Instructions for follow-up, activity and diet:	-please conitnue Pepcid  -plesae follow up wtih your PMD for follow up for dyspepsia given above age 50.

## 2017-08-16 NOTE — PROGRESS NOTE ADULT - SUBJECTIVE AND OBJECTIVE BOX
Subjective: Pt seen and examined at bedside. No new complaints overnight.    Vital Signs Last 24 Hrs  T(C): 36.8 (08-16-17 @ 05:21), Max: 36.8 (08-15-17 @ 08:14)  T(F): 98.3 (08-16-17 @ 05:21), Max: 98.3 (08-15-17 @ 23:49)  HR: 80 (08-16-17 @ 05:21) (48 - 93)  BP: 153/69 (08-16-17 @ 05:21) (125/81 - 170/113)  RR: 18 (08-16-17 @ 05:21) (16 - 18)  SpO2: 98% (08-16-17 @ 05:21) (94% - 99%)    Physical Exam:    Neurologic: Awake, alert, oriented to self, place, and hospital, speech clear, follows commands, moves all extremities with 5/5 strength, sensation intact to light touch throughout, pupils 3mm and reactive bilaterally, EOM intact, face symmetric, tongue midline    Incision: +staples, c/d/i    General: No acute distress, sitting in chair  Cardiac: [id number]  Lungs: Clear  Abdomen: Soft, non-tender, +BS  Extremities: No c/c/e    MEDICATIONS  (STANDING):  levETIRAcetam 500 milliGRAM(s) Oral every 12 hours  famotidine    Tablet 20 milliGRAM(s) Oral every 12 hours  enoxaparin Injectable 40 milliGRAM(s) SubCutaneous at bedtime  losartan 25 milliGRAM(s) Oral daily  dextrose 50% Injectable 12.5 Gram(s) IV Push once  dextrose 50% Injectable 25 Gram(s) IV Push once  dextrose 50% Injectable 25 Gram(s) IV Push once  insulin lispro (HumaLOG) corrective regimen sliding scale   SubCutaneous three times a day before meals  insulin lispro (HumaLOG) corrective regimen sliding scale   SubCutaneous at bedtime  amLODIPine   Tablet 10 milliGRAM(s) Oral daily  insulin lispro Injectable (HumaLOG) 8 Unit(s) SubCutaneous three times a day before meals  insulin glargine Injectable (LANTUS) 19 Unit(s) SubCutaneous at bedtime  acetaminophen  IVPB. 1000 milliGRAM(s) IV Intermittent once    MEDICATIONS  (PRN):  acetaminophen   Tablet 650 milliGRAM(s) Oral every 6 hours PRN For Temp greater than 38 C (100.4 F)  acetaminophen   Tablet. 650 milliGRAM(s) Oral every 6 hours PRN Mild Pain (1 - 3)  oxyCODONE    5 mG/acetaminophen 325 mG 1 Tablet(s) Oral every 4 hours PRN Moderate Pain  ondansetron Injectable 4 milliGRAM(s) IV Push every 6 hours PRN Nausea and/or Vomiting  oxyCODONE    5 mG/acetaminophen 325 mG 2 Tablet(s) Oral every 4 hours PRN Severe Pain  dextrose Gel 1 Dose(s) Oral once PRN Blood Glucose LESS THAN 70 milliGRAM(s)/deciliter  glucagon  Injectable 1 milliGRAM(s) IntraMuscular once PRN Glucose LESS THAN 70 milligrams/deciliter    IMAGING:    CT Head No Cont (08.14.17 @ 09:36)  Decreased intracranial air. Otherwise stable examination.

## 2017-08-16 NOTE — DISCHARGE NOTE ADULT - HOSPITAL COURSE
57M with HTN, HLD, DM tranfer [facility] with 3 days of difficulty walking. A CT head showed large right sided subacute SDH with 1.5cm MLS. Pt went for rt mary grace hole for sdh evacuation on 8/12/2017. Pt had a subdural drain that was taken out on 8/14/2017. Post op drain pull CT was done and was stable.  Endocrine consulted for uncontrolled diabetes and is being discharged with increased home meds of anti-dm meds. On 8/16/2017 pt complained of chest/gas pain. A 12 lead EKG was done and cardiac enzymes x2 sent and were negative.    Pt was evaluated by PT/OT as well as PM and R and they recommended acute rehab. On the day of discharge pt is medically and neurologically stable for discharge to rehab facility

## 2017-08-16 NOTE — PROGRESS NOTE ADULT - ASSESSMENT
56 y/o male with a h/o of HTN, DM, SDH in 2016 s/p fall, p/w 3 days of walking difficulty. Denied trauma. Went to [facility] where CT head showed large right sided subacute SDH with 1.5cm MLS. Admitted on 8/12 s/p R mary grace hole for subacute SDH evacuation, POD #3.   8/12-Post op CT demonstrated hydrocephalus and air. CTA head negative.   8/14- d/c'd       Plan:  -Continue keppra 500q12 (till 8/19) for seizure ppx  -Continue Percocet PRN for headaches  -Endo following. Continue Lantus 19units qHS, Humalog 8units TID AC, moderate HISS for DM.  -Continue Losartan/norvasc for HTN. BP better controlled overnight.  -Encouraged mobilization  -Incentive spirometer  -DVT ppx: SQL, venodynes  -Dispo: PT/OT/PMR-Acute rehab  -Likely D/C to [facility] today; awaiting authorization  -will discuss with Dr. Guadarrama  #[id number]

## 2017-08-16 NOTE — PROGRESS NOTE ADULT - PROVIDER SPECIALTY LIST ADULT
Endocrinology
NSICU
Neurosurgery
Hospitalist

## 2017-08-16 NOTE — PROGRESS NOTE ADULT - ASSESSMENT
57 year old male with hx of HTN, DM2, HLD, SDH in 2016 s/p fall, p/w 3 days of walking difficulty. Found to have large right sided subacute SDH with 1.5cm MLS. Admitted on 8/12 s/p R mary grace hole for subacute SDH evacuation, POD 4.

## 2017-09-06 ENCOUNTER — APPOINTMENT (OUTPATIENT)
Dept: NEUROSURGERY | Facility: CLINIC | Age: 57
End: 2017-09-06

## 2018-02-13 NOTE — DISCHARGE NOTE ADULT - LAUNCH MEDICATION RECONCILIATION
Include Location In Plan?: No Detail Level: Detailed Detail Level: Generalized <<-----Click here for Discharge Medication Review

## 2018-10-17 NOTE — ED PROVIDER NOTE - NSCAREINITIATED _GEN_ER
Denis Mendez(Resident) 65 Y/O male c/o left sided weakness , facial droop,  s/p TpA, resulting in brainstem hemorrhage to the right midbrain. Called for goals of care

## 2019-06-23 NOTE — PHYSICAL THERAPY INITIAL EVALUATION ADULT - GENERAL OBSERVATIONS, REHAB EVAL
Left eye red and crusty.  Has been using old eye drops for 3 days.  No contacts.   Pt. received reclined in bed NAD, cardiac monitor

## 2019-08-18 NOTE — CONSULT NOTE ADULT - PROBLEM SELECTOR RECOMMENDATION 9
discussed with patient and family importance of glycemic control  lantus 20 units at bedtime humalog 7 units before meals and moderate sliding scale  reviewed plan with team  will be discharged on insulin and to follow with his endocrinologist needs to clarify with patient that he has one  to follow up with polo no

## 2019-09-06 NOTE — ED ADULT NURSE NOTE - DISCHARGE TEACHING
Right UE Active ROM was WNL (within normal limits)/RLE Active ROM was WNL (within normal limits)/L UE resting in sling with ace bandage/LLE Active ROM was WNL (within normal limits)
yes

## 2022-07-21 NOTE — ED ADULT NURSE NOTE - PSH
Abrasion of right middle finger    Acute medial meniscus tear of left knee    History of appendectomy Complex Repair And Epidermal Autograft Text: The defect edges were debeveled with a #15 scalpel blade.  The primary defect was closed partially with a complex linear closure.  Given the location of the defect, shape of the defect and the proximity to free margins an epidermal autograft was deemed most appropriate to repair the remaining defect.  The graft was trimmed to fit the size of the remaining defect.  The graft was then placed in the primary defect, oriented appropriately, and sutured into place.

## 2024-03-10 NOTE — PROGRESS NOTE ADULT - SUBJECTIVE AND OBJECTIVE BOX
SUBJECTIVE:   Feels better.    OVERNIGHT EVENTS:   Transferred to floor yesterday   PM+R evaluation today - acute rehab    Vital Signs Last 24 Hrs  T(C): 36.7 (15 Aug 2017 15:28), Max: 37.1 (15 Aug 2017 05:08)  T(F): 98.1 (15 Aug 2017 15:28), Max: 98.7 (15 Aug 2017 05:08)  HR: 93 (15 Aug 2017 15:28) (48 - 93)  BP: 159/92 (15 Aug 2017 15:28) (133/84 - 170/113)  BP(mean): --  RR: 18 (15 Aug 2017 15:28) (18 - 18)  SpO2: 99% (15 Aug 2017 15:28) (94% - 99%)    PHYSICAL EXAM:    General: No Acute Distress     Neurological: Awake, alert oriented to person, place and time, Following Commands, PERRL, EOMI, Face Symmetrical, Speech Fluent, Moving all extremities, Muscle Strength normal in all four extremities, No Drift on my exam today, Sensation to Light Touch Intact    Incision: C/D/I w/ staples    LABS:                        15.5   15.5  )-----------( 297      ( 13 Aug 2017 22:41 )             44.5    08-13    139  |  101  |  23  ----------------------------<  192<H>  3.4<L>   |  26  |  0.96    Ca    8.5      13 Aug 2017 22:41  Phos  2.8     08-13  Mg     1.8     08-13      Hemoglobin A1C, Whole Blood: 7.5 % (08-12 @ 15:05)      08-14 @ 07:01  -  08-15 @ 07:00  --------------------------------------------------------  IN: 1010 mL / OUT: 1120 mL / NET: -110 mL    08-15 @ 07:01  -  08-15 @ 16:28  --------------------------------------------------------  IN: 720 mL / OUT: 0 mL / NET: 720 mL      DRAINS:     MEDICATIONS:  Antibiotics:    Neuro:  acetaminophen   Tablet 650 milliGRAM(s) Oral every 6 hours PRN For Temp greater than 38 C (100.4 F)  acetaminophen   Tablet. 650 milliGRAM(s) Oral every 6 hours PRN Mild Pain (1 - 3)  oxyCODONE    5 mG/acetaminophen 325 mG 1 Tablet(s) Oral every 4 hours PRN Moderate Pain  levETIRAcetam 500 milliGRAM(s) Oral every 12 hours  ondansetron Injectable 4 milliGRAM(s) IV Push every 6 hours PRN Nausea and/or Vomiting  oxyCODONE    5 mG/acetaminophen 325 mG 2 Tablet(s) Oral every 4 hours PRN Severe Pain  acetaminophen  IVPB. 1000 milliGRAM(s) IV Intermittent once    Cardiac:  losartan 25 milliGRAM(s) Oral daily  amLODIPine   Tablet 10 milliGRAM(s) Oral daily    Pulm:    GI/:  famotidine    Tablet 20 milliGRAM(s) Oral every 12 hours    Other:   enoxaparin Injectable 40 milliGRAM(s) SubCutaneous at bedtime  dextrose Gel 1 Dose(s) Oral once PRN Blood Glucose LESS THAN 70 milliGRAM(s)/deciliter  dextrose 50% Injectable 12.5 Gram(s) IV Push once  dextrose 50% Injectable 25 Gram(s) IV Push once  dextrose 50% Injectable 25 Gram(s) IV Push once  glucagon  Injectable 1 milliGRAM(s) IntraMuscular once PRN Glucose LESS THAN 70 milligrams/deciliter  insulin lispro (HumaLOG) corrective regimen sliding scale   SubCutaneous three times a day before meals  insulin lispro (HumaLOG) corrective regimen sliding scale   SubCutaneous at bedtime  insulin lispro Injectable (HumaLOG) 8 Unit(s) SubCutaneous three times a day before meals  insulin glargine Injectable (LANTUS) 19 Unit(s) SubCutaneous at bedtime    DIET: [x] Regular [] CCD [] Renal [] Puree [] Dysphagia [] Tube Feeds:     IMAGING: Yes
